# Patient Record
Sex: FEMALE | Race: BLACK OR AFRICAN AMERICAN | NOT HISPANIC OR LATINO | Employment: FULL TIME | URBAN - METROPOLITAN AREA
[De-identification: names, ages, dates, MRNs, and addresses within clinical notes are randomized per-mention and may not be internally consistent; named-entity substitution may affect disease eponyms.]

---

## 2024-01-24 ENCOUNTER — PREP FOR SURGERY (OUTPATIENT)
Dept: OTHER | Facility: HOSPITAL | Age: 59
End: 2024-01-24
Payer: COMMERCIAL

## 2024-01-24 ENCOUNTER — OFFICE VISIT (OUTPATIENT)
Dept: ORTHOPEDIC SURGERY | Facility: CLINIC | Age: 59
End: 2024-01-24
Payer: COMMERCIAL

## 2024-01-24 VITALS
SYSTOLIC BLOOD PRESSURE: 152 MMHG | BODY MASS INDEX: 40.8 KG/M2 | DIASTOLIC BLOOD PRESSURE: 94 MMHG | HEIGHT: 63 IN | WEIGHT: 230.3 LBS

## 2024-01-24 DIAGNOSIS — M25.572 LEFT ANKLE PAIN, UNSPECIFIED CHRONICITY: Primary | ICD-10-CM

## 2024-01-24 DIAGNOSIS — S82.852A TRIMALLEOLAR FRACTURE OF ANKLE, CLOSED, LEFT, INITIAL ENCOUNTER: ICD-10-CM

## 2024-01-24 DIAGNOSIS — S82.892A CLOSED FRACTURE OF LEFT ANKLE, INITIAL ENCOUNTER: Primary | ICD-10-CM

## 2024-01-24 RX ORDER — OXYCODONE HYDROCHLORIDE AND ACETAMINOPHEN 5; 325 MG/1; MG/1
1 TABLET ORAL EVERY 6 HOURS PRN
Qty: 24 TABLET | Refills: 0 | Status: SHIPPED | OUTPATIENT
Start: 2024-01-24

## 2024-01-24 NOTE — PROGRESS NOTES
[facility] [place] Orthopaedic Surgery Office Visit     Office Visit       Date: 01/24/2024   Patient Name: Liya Sales  MRN: [id number]  YOB: 1965    Referring Physician: Referring, Self     Chief Complaint:   Chief Complaint   Patient presents with    Left Ankle - Pain     DOI: 1/19/24       History of Present Illness: Liya Sales is a 58 y.o. female who is here today with left ankle pain.  Suffered mechanical fall on ice on January 19.  Subsequently seen and told she had a fracture.  Was placed in a three-way splint and made nonweightbearing.  States does have persistent pain at her fracture site.  Denies any other injuries or sources of pain.  Denies any other significant medical comorbidities.    Subjective   Review of Systems: Review of Systems   Constitutional: Negative.  Negative for chills, fatigue and fever.   HENT: Negative.  Negative for congestion and dental problem.    Eyes: Negative.  Negative for blurred vision.   Respiratory: Negative.  Negative for shortness of breath.    Cardiovascular: Negative.  Negative for leg swelling.   Gastrointestinal: Negative.  Negative for abdominal pain.   Endocrine: Negative.  Negative for polyuria.   Genitourinary: Negative.  Negative for difficulty urinating.   Musculoskeletal:  Positive for arthralgias.   Skin: Negative.    Allergic/Immunologic: Negative.    Neurological: Negative.    Hematological: Negative.  Negative for adenopathy.   Psychiatric/Behavioral: Negative.  Negative for behavioral problems.         Past Medical History:   Past Medical History:   Diagnosis Date    Allergies     Anxiety     Arthritis     left ankle    Carpal tunnel syndrome on right     Depression     Fracture, fibula 1/19/2024    Knee swelling 10/22    Overactive bladder     Tear of meniscus of knee 10/22       Past Surgical History:   Past Surgical History:   Procedure Laterality Date    BILATERAL BREAST REDUCTION  2005    BREAST BIOPSY  Left 2006    in [place]     SECTION       and     LIPOMA EXCISION      under right arm    TRIGGER POINT INJECTION  10/22       Family History:   Family History   Problem Relation Age of Onset    Heart disease Mother         carotid aneyrysm     Hypertension Mother     Hyperlipidemia Mother     Obesity Mother     Heart disease Father     Diabetes Father     Hypertension Father     Hyperlipidemia Father     Stroke Father     Obesity Father     Arthritis Sister     Hypertension Sister     Mental illness Sister     Obesity Sister     Tuberculosis Maternal Grandmother     Stroke Paternal Grandmother 54    Diabetes Sister        Social History:   Social History     Socioeconomic History    Marital status:     Number of children: 3   Tobacco Use    Smoking status: Never     Passive exposure: Never    Smokeless tobacco: Never   Vaping Use    Vaping Use: Never used   Substance and Sexual Activity    Alcohol use: Yes     Alcohol/week: 2.0 standard drinks of alcohol     Types: 2 Glasses of wine per week     Comment: hard apple cider and wine    Drug use: No    Sexual activity: Yes     Partners: Male     Birth control/protection: Post-menopausal       Medications:   Current Outpatient Medications:     ammonium lactate (AmLactin) 12 % lotion, Apply  topically to the appropriate area as directed As Needed for Dry Skin., Disp: 225 g, Rfl: 1    atorvastatin (LIPITOR) 40 MG tablet, Take 1 tablet by mouth Every Night., Disp: 90 tablet, Rfl: 1    celecoxib (CeleBREX) 100 MG capsule, Take 1 capsule by mouth 2 (Two) Times a Day As Needed for Mild Pain or Moderate Pain., Disp: 60 capsule, Rfl: 0    famotidine (Pepcid) 40 MG tablet, Take 1 tablet by mouth Daily., Disp: 90 tablet, Rfl: 0    hydrOXYzine (ATARAX) 50 MG tablet, Take 1 tablet by mouth every night at bedtime as needed for sleep/anxiety., Disp: 30 tablet, Rfl: 1    levocetirizine (Xyzal Allergy 24HR) 5 MG tablet, , Disp: , Rfl:     lisdexamfetamine  "(VYVANSE) 50 MG capsule, Take 1 capsule by mouth Every Morning, Disp: 14 capsule, Rfl: 0    Mirabegron ER (Myrbetriq) 50 MG tablet sustained-release 24 hour 24 hr tablet, Take 50 mg by mouth Daily., Disp: 90 tablet, Rfl: 3    multivitamin with minerals tablet tablet, Take 1 tablet by mouth Daily., Disp: , Rfl:     omeprazole (priLOSEC) 40 MG capsule, Take 1 capsule by mouth Daily., Disp: 90 capsule, Rfl: 1    Semaglutide-Weight Management (Wegovy) 0.25 MG/0.5ML solution auto-injector, Inject 0.25 mg under the skin into the appropriate area as directed 1 (One) Time Per Week., Disp: 2 mL, Rfl: 0    sertraline (ZOLOFT) 100 MG tablet, Take 1.5 tablets by mouth Daily., Disp: 135 tablet, Rfl: 3    sodium-potassium-magnesium sulfates (Suprep Bowel Prep Kit) 17.5-3.13-1.6 GM/177ML solution oral solution, Take 2 bottles by mouth Take As Directed. Do not eat the day before your procedure. If you didn't receive instructions call [phone]., Disp: 354 mL, Rfl: 0    triamcinolone (KENALOG) 0.1 % ointment, Apply 1 application topically to the appropriate area as directed 2 (Two) Times a Day., Disp: 80 g, Rfl: 1    Turmeric (QC TUMERIC COMPLEX PO), Take  by mouth., Disp: , Rfl:     oxyCODONE-acetaminophen (PERCOCET) 5-325 MG per tablet, Take 1 tablet by mouth Every 6 (Six) Hours As Needed for Moderate Pain., Disp: 24 tablet, Rfl: 0    Allergies:   Allergies   Allergen Reactions    Erythromycin GI Intolerance       I reviewed the patient's chief complaint, history of present illness, review of systems, past medical history, surgical history, family history, social history, medications and allergy list.     Objective    Vital Signs:   Vitals:    01/24/24 0829   BP: 152/94   Weight: 104 kg (230 lb 4.8 oz)   Height: 160 cm (63\")     Body mass index is 40.8 kg/m².    Ortho Exam:  left LE Foot and Ankle Exam:   Neurological exam of the superficial peroneal, deep peroneal, plantar, sural and saphenous nerves demonstrates intact " sensation.   10/10 sites felt on monofilament testing of foot.   Able to flex and extend toes, can dorsiflex and plantarflex ankle however motion limited 2/2 to pain.  Peripheral pulses including posterior tibial artery and deep peroneal artery are intact and palpable. There is good perfusion to the toes.   The skin is intact throughout the foot and ankle without ulceration.   There is tenderness to palpation about the ankle, worst laterally. No tenderness to palpation over the proximal fibula, visible swelling about the ankle with ecchymosis lateral hindfoot.      Results Review:   Imaging Results (Last 24 Hours)       Procedure Component Value Units Date/Time    XR Ankle 2 View Left [[id number]] Resulted: 01/24/24 0941     Updated: 01/24/24 0942    Narrative:      Left Ankle X-Ray 01/24/24   Indication: Pain  Views: 3 weight bearing , comparison to previous  Findings: xrays reviewed by me today in the office and show   redemonstration trimalleolar ankle fracture, increased medial joint space   widening on stress view as well as appearance of increased tib-fib clear   space               01/24/24 I have personally reviewed and interpreted the images from outside facility with the documented findings, tib-fib x-rays and dedicated ankle x-rays from outside facility from January 19 reviewed showing trimalleolar ankle fracture on ankle x-ray, tib-fib fracture does not show any proximal fractures    Assessment / Plan    Assessment/Plan:   Diagnoses and all orders for this visit:    1. Left ankle pain, unspecified chronicity (Primary)  -     XR Ankle 2 View Left    2. Trimalleolar fracture of ankle, closed, left, initial encounter  -     oxyCODONE-acetaminophen (PERCOCET) 5-325 MG per tablet; Take 1 tablet by mouth Every 6 (Six) Hours As Needed for Moderate Pain.  Dispense: 24 tablet; Refill: 0      Discussed fracture with patient as well as treatment options at length. Also reviewed external note and imaging studies from  January 19. Patient has a fracture (an injury with risk of long term functional impairment). I explained fractures of joints to the patient.  I explained that all joint fractures will develop some arthritis. The goal of treatment is to put the joint back in  place as anatomically as possible, and hold it there to heal. This helps to slow down the progression of post-traumatic arthritis.  We cannot eliminate it.  I explained that fractures have stable and unstable patterns.  I think this fracture is too unstable to hold in a cast, I think the risk of malunion and non-union are too high if we use cast treatment. I am recommending surgical fixation.  I explained the surgery and plan for fixation. I explained that most hardware is designed to stay in permanently however rarely becomes bothersome and can be removed.  I explained the possibility of syndesmotic fixation and the possible need to remove this fixation (I will not know if this is necessary until we are in surgery).  I explained how all ankle fractures swell for months to years, some permanently. Questions were asked and answered in detail.    Plan for surgery will be ORIF left ankle, possible syndesmotic fixation, all other indicated procedures.     The risks and benefits of the procedure were discussed with the patient and or appropriate guardian, which include but are not limited to the risk of bleeding, infection, neurovascular damage, post-operative stiffness, tendon and/or ligament retears, recurrent instability, continued pain, arthritic pain, need for further revision surgeries in the future, deep venous thrombosis, and general risks from anesthesia. We also discussed the post-operative rehabilitation, the need for physical therapy, and the overall expected outcomes from the procedure. I allowed proper time and answered the patient's questions regarding the procedure. Knowing what the risks are and what the conservative treatment is, the patient elected  to forgo any further conservative treatment options and proceed with the surgical intervention.        Follow Up:   Return in about 2 weeks (around 2/7/2024) for F/U after Surgery.      Jasvir Tucker MD  [facility] Surgeon  Answers submitted by the patient for this visit:  Primary Reason for Visit (Submitted on 1/24/2024)  What is the primary reason for your visit?: Lower Extremity Injury  Lower Extremity Injury Questionnaire (Submitted on 1/24/2024)  Chief Complaint: Lower extremity pain  Incident occurred: 3 to 5 days ago  Incident location: at home  Injury mechanism: a fall  Pain location: left leg, left ankle  Pain quality: aching  Pain - numeric: 5/10  Pain course: improving  inability to bear weight: Yes  loss of motion: Yes  Aggravated by: movement, weight bearing

## 2024-01-25 ENCOUNTER — TELEPHONE (OUTPATIENT)
Dept: ORTHOPEDIC SURGERY | Facility: CLINIC | Age: 59
End: 2024-01-25

## 2024-01-25 NOTE — TELEPHONE ENCOUNTER
Patient broke leg 2 days into starting new job and received work note advising patient to to remain off work however employer is requesting letter advising patient can work from home. Patient was given laptop. Okay to provide?     Upload letter into [employer] when complete.

## 2024-01-25 NOTE — TELEPHONE ENCOUNTER
That fine as long as she can work seated or laying down and elevate leg while she's working.    Per Dr Tucker        I typed letter and saved it to chart, so that she can access from [employer].    Nabila LUNDY (IZZY) ROT

## 2024-01-29 ENCOUNTER — ANESTHESIA EVENT (OUTPATIENT)
Dept: PERIOP | Facility: HOSPITAL | Age: 59
End: 2024-01-29
Payer: COMMERCIAL

## 2024-01-29 RX ORDER — SODIUM CHLORIDE 0.9 % (FLUSH) 0.9 %
10 SYRINGE (ML) INJECTION EVERY 12 HOURS SCHEDULED
Status: CANCELLED | OUTPATIENT
Start: 2024-01-29

## 2024-01-29 RX ORDER — SODIUM CHLORIDE 0.9 % (FLUSH) 0.9 %
10 SYRINGE (ML) INJECTION AS NEEDED
Status: CANCELLED | OUTPATIENT
Start: 2024-01-29

## 2024-01-29 RX ORDER — SODIUM CHLORIDE 9 MG/ML
40 INJECTION, SOLUTION INTRAVENOUS AS NEEDED
Status: CANCELLED | OUTPATIENT
Start: 2024-01-29

## 2024-01-29 RX ORDER — FAMOTIDINE 10 MG/ML
20 INJECTION, SOLUTION INTRAVENOUS ONCE
Status: CANCELLED | OUTPATIENT
Start: 2024-01-29 | End: 2024-01-29

## 2024-01-29 NOTE — ANESTHESIA PREPROCEDURE EVALUATION
Anesthesia Evaluation     Patient summary reviewed and Nursing notes reviewed   no history of anesthetic complications:   NPO Solid Status: > 8 hours  NPO Liquid Status: > 2 hours           Airway   Mallampati: II  TM distance: >3 FB  Neck ROM: full  Possible difficult intubation  Dental - normal exam     Pulmonary - normal exam   (-) COPD, sleep apnea, not a smoker  Cardiovascular - normal exam  Exercise tolerance: good (4-7 METS)    (+) hyperlipidemia  (-) dysrhythmias, angina, CHF, cardiac stents      Neuro/Psych  (+) numbness, psychiatric history ADHD, Anxiety and Depression  (-) seizures, CVA  GI/Hepatic/Renal/Endo    (+) morbid obesity, GERD well controlled  (-) no renal disease, diabetes, no thyroid disorder    Musculoskeletal     Abdominal    Substance History   (+) alcohol use  (-) drug use     OB/GYN          Other   arthritis,     ROS/Med Hx Other: Ozempic  - has not taken  Seas allergies  Gerd on 2 Rx                Anesthesia Plan    ASA 3     general with block     (Risks and benefits of general anesthesia discussed with patient (including MI, CVA, death, recall, aspiration, oropharyngeal/dental damage), questions answered, agreeable to proceed.    Benefits and risks of nerve block/catheter discussed with patient ((including failed block, damage to nerve/nearby structures, intravascular injection)); questions answered, agreeable to proceed.    )  intravenous induction     Anesthetic plan, risks, benefits, and alternatives have been provided, discussed and informed consent has been obtained with: patient.    Use of blood products discussed with patient  Consented to blood products.    Plan discussed with CRNA.    CODE STATUS:

## 2024-01-30 ENCOUNTER — APPOINTMENT (OUTPATIENT)
Dept: OTHER | Facility: HOSPITAL | Age: 59
End: 2024-01-30
Payer: COMMERCIAL

## 2024-01-30 ENCOUNTER — HOSPITAL ENCOUNTER (OUTPATIENT)
Facility: HOSPITAL | Age: 59
Discharge: HOME | End: 2024-01-31
Attending: ORTHOPAEDIC SURGERY | Admitting: ORTHOPAEDIC SURGERY
Payer: COMMERCIAL

## 2024-01-30 ENCOUNTER — ANESTHESIA (OUTPATIENT)
Dept: PERIOP | Facility: HOSPITAL | Age: 59
End: 2024-01-30
Payer: COMMERCIAL

## 2024-01-30 ENCOUNTER — ANESTHESIA EVENT CONVERTED (OUTPATIENT)
Dept: ANESTHESIOLOGY | Facility: HOSPITAL | Age: 59
End: 2024-01-30
Payer: COMMERCIAL

## 2024-01-30 ENCOUNTER — TELEPHONE (OUTPATIENT)
Dept: ORTHOPEDIC SURGERY | Facility: CLINIC | Age: 59
End: 2024-01-30

## 2024-01-30 ENCOUNTER — APPOINTMENT (OUTPATIENT)
Dept: GENERAL RADIOLOGY | Facility: HOSPITAL | Age: 59
End: 2024-01-30
Payer: COMMERCIAL

## 2024-01-30 DIAGNOSIS — Z98.890 S/P ORIF (OPEN REDUCTION INTERNAL FIXATION) FRACTURE: ICD-10-CM

## 2024-01-30 DIAGNOSIS — S82.892A CLOSED FRACTURE OF LEFT ANKLE, INITIAL ENCOUNTER: ICD-10-CM

## 2024-01-30 DIAGNOSIS — Z87.81 S/P ORIF (OPEN REDUCTION INTERNAL FIXATION) FRACTURE: ICD-10-CM

## 2024-01-30 DIAGNOSIS — Z00.6 EXAMINATION FOR NORMAL COMPARISON FOR CLINICAL RESEARCH: Primary | ICD-10-CM

## 2024-01-30 PROBLEM — E66.9 OBESITY: Status: ACTIVE | Noted: 2024-01-30

## 2024-01-30 PROBLEM — S82.899A ANKLE FRACTURE: Status: ACTIVE | Noted: 2024-01-30

## 2024-01-30 LAB
ANION GAP SERPL CALCULATED.3IONS-SCNC: 10 MMOL/L (ref 5–15)
BUN SERPL-MCNC: 14 MG/DL (ref 6–20)
BUN/CREAT SERPL: 18.2 (ref 7–25)
CALCIUM SPEC-SCNC: 9.5 MG/DL (ref 8.6–10.5)
CHLORIDE SERPL-SCNC: 102 MMOL/L (ref 98–107)
CO2 SERPL-SCNC: 26 MMOL/L (ref 22–29)
CREAT SERPL-MCNC: 0.77 MG/DL (ref 0.57–1)
DEPRECATED RDW RBC AUTO: 42.5 FL (ref 37–54)
EGFRCR SERPLBLD CKD-EPI 2021: 89 ML/MIN/1.73
ERYTHROCYTE [DISTWIDTH] IN BLOOD BY AUTOMATED COUNT: 13 % (ref 12.3–15.4)
GLUCOSE SERPL-MCNC: 95 MG/DL (ref 65–99)
HCT VFR BLD AUTO: 43.7 % (ref 34–46.6)
HGB BLD-MCNC: 13.5 G/DL (ref 12–15.9)
MCH RBC QN AUTO: 27.8 PG (ref 26.6–33)
MCHC RBC AUTO-ENTMCNC: 30.9 G/DL (ref 31.5–35.7)
MCV RBC AUTO: 90.1 FL (ref 79–97)
PLATELET # BLD AUTO: 306 10*3/MM3 (ref 140–450)
PMV BLD AUTO: 9.7 FL (ref 6–12)
POTASSIUM SERPL-SCNC: 4.2 MMOL/L (ref 3.5–5.2)
RBC # BLD AUTO: 4.85 10*6/MM3 (ref 3.77–5.28)
SODIUM SERPL-SCNC: 138 MMOL/L (ref 136–145)
WBC NRBC COR # BLD AUTO: 5.82 10*3/MM3 (ref 3.4–10.8)

## 2024-01-30 PROCEDURE — 27829 TREAT LOWER LEG JOINT: CPT | Performed by: ORTHOPAEDIC SURGERY

## 2024-01-30 PROCEDURE — 97530 THERAPEUTIC ACTIVITIES: CPT

## 2024-01-30 PROCEDURE — S0260 H&P FOR SURGERY: HCPCS | Performed by: ORTHOPAEDIC SURGERY

## 2024-01-30 PROCEDURE — G0378 HOSPITAL OBSERVATION PER HR: HCPCS

## 2024-01-30 PROCEDURE — 25010000002 CEFAZOLIN PER 500 MG: Performed by: ORTHOPAEDIC SURGERY

## 2024-01-30 PROCEDURE — 25810000003 LACTATED RINGERS PER 1000 ML: Performed by: ANESTHESIOLOGY

## 2024-01-30 PROCEDURE — C1713 ANCHOR/SCREW BN/BN,TIS/BN: HCPCS | Performed by: ORTHOPAEDIC SURGERY

## 2024-01-30 PROCEDURE — 25010000002 BUPIVACAINE (PF) 0.25 % SOLUTION: Performed by: NURSE ANESTHETIST, CERTIFIED REGISTERED

## 2024-01-30 PROCEDURE — 80048 BASIC METABOLIC PNL TOTAL CA: CPT | Performed by: ANESTHESIOLOGY

## 2024-01-30 PROCEDURE — 25010000002 SUGAMMADEX 200 MG/2ML SOLUTION

## 2024-01-30 PROCEDURE — 25010000002 PROPOFOL 10 MG/ML EMULSION

## 2024-01-30 PROCEDURE — 27829 TREAT LOWER LEG JOINT: CPT | Performed by: PHYSICIAN ASSISTANT

## 2024-01-30 PROCEDURE — 27792 TREATMENT OF ANKLE FRACTURE: CPT | Performed by: ORTHOPAEDIC SURGERY

## 2024-01-30 PROCEDURE — 76000 FLUOROSCOPY <1 HR PHYS/QHP: CPT

## 2024-01-30 PROCEDURE — 25010000002 ROPIVACAINE HCL-NACL 0.2-0.9 % SOLUTION: Performed by: NURSE ANESTHETIST, CERTIFIED REGISTERED

## 2024-01-30 PROCEDURE — 76000 FLUOROSCOPY <1 HR PHYS/QHP: CPT | Performed by: ORTHOPAEDIC SURGERY

## 2024-01-30 PROCEDURE — 85027 COMPLETE CBC AUTOMATED: CPT | Performed by: ANESTHESIOLOGY

## 2024-01-30 PROCEDURE — 25810000003 SODIUM CHLORIDE 0.9 % SOLUTION: Performed by: ORTHOPAEDIC SURGERY

## 2024-01-30 PROCEDURE — 25010000002 PHENYLEPHRINE 10 MG/ML SOLUTION 1 ML VIAL

## 2024-01-30 PROCEDURE — 25010000002 DEXAMETHASONE SODIUM PHOSPHATE 10 MG/ML SOLUTION: Performed by: NURSE ANESTHETIST, CERTIFIED REGISTERED

## 2024-01-30 PROCEDURE — 25010000002 DEXAMETHASONE PER 1 MG

## 2024-01-30 PROCEDURE — 97161 PT EVAL LOW COMPLEX 20 MIN: CPT

## 2024-01-30 PROCEDURE — 25010000002 FENTANYL CITRATE (PF) 50 MCG/ML SOLUTION: Performed by: NURSE ANESTHETIST, CERTIFIED REGISTERED

## 2024-01-30 PROCEDURE — 27792 TREATMENT OF ANKLE FRACTURE: CPT | Performed by: PHYSICIAN ASSISTANT

## 2024-01-30 PROCEDURE — 25010000002 ONDANSETRON PER 1 MG

## 2024-01-30 DEVICE — SCRW CORT LP SS 3.5X12MM: Type: IMPLANTABLE DEVICE | Site: ANKLE | Status: FUNCTIONAL

## 2024-01-30 DEVICE — SCRW COMPR KREULOCK LK FUL/THRD SS 3.5X14MM: Type: IMPLANTABLE DEVICE | Site: ANKLE | Status: FUNCTIONAL

## 2024-01-30 DEVICE — SCRW CORT NL L/P SS 2.7X20MM: Type: IMPLANTABLE DEVICE | Site: ANKLE | Status: FUNCTIONAL

## 2024-01-30 DEVICE — SCRW CORT LP SS 2.7X18MM: Type: IMPLANTABLE DEVICE | Site: ANKLE | Status: FUNCTIONAL

## 2024-01-30 DEVICE — SCRW COMPR KREULOCK LK FUL/THRD SS 2.7X16MM: Type: IMPLANTABLE DEVICE | Site: ANKLE | Status: FUNCTIONAL

## 2024-01-30 DEVICE — SYS SYNDESMOSIS REPR ANKL TIGHTROPE/XP KNOTLS SS: Type: IMPLANTABLE DEVICE | Site: ANKLE | Status: FUNCTIONAL

## 2024-01-30 DEVICE — IMPLANTABLE DEVICE: Type: IMPLANTABLE DEVICE | Site: ANKLE | Status: FUNCTIONAL

## 2024-01-30 DEVICE — SCRW COMPR KREULOCK LK FUL/THRD SS 2.7X12MM: Type: IMPLANTABLE DEVICE | Site: ANKLE | Status: FUNCTIONAL

## 2024-01-30 RX ORDER — LABETALOL HYDROCHLORIDE 5 MG/ML
10 INJECTION, SOLUTION INTRAVENOUS EVERY 4 HOURS PRN
Status: DISCONTINUED | OUTPATIENT
Start: 2024-01-30 | End: 2024-01-31 | Stop reason: HOSPADM

## 2024-01-30 RX ORDER — FAMOTIDINE 20 MG/1
20 TABLET, FILM COATED ORAL ONCE
Status: COMPLETED | OUTPATIENT
Start: 2024-01-30 | End: 2024-01-30

## 2024-01-30 RX ORDER — ASPIRIN 81 MG/1
81 TABLET ORAL DAILY
Status: DISCONTINUED | OUTPATIENT
Start: 2024-01-30 | End: 2024-01-31 | Stop reason: HOSPADM

## 2024-01-30 RX ORDER — EPHEDRINE SULFATE 50 MG/ML
INJECTION INTRAVENOUS AS NEEDED
Status: DISCONTINUED | OUTPATIENT
Start: 2024-01-30 | End: 2024-01-30 | Stop reason: SURG

## 2024-01-30 RX ORDER — MIDAZOLAM HYDROCHLORIDE 1 MG/ML
1 INJECTION INTRAMUSCULAR; INTRAVENOUS
Status: DISCONTINUED | OUTPATIENT
Start: 2024-01-30 | End: 2024-01-30 | Stop reason: HOSPADM

## 2024-01-30 RX ORDER — SODIUM CHLORIDE 9 MG/ML
40 INJECTION, SOLUTION INTRAVENOUS AS NEEDED
Status: DISCONTINUED | OUTPATIENT
Start: 2024-01-30 | End: 2024-01-31 | Stop reason: HOSPADM

## 2024-01-30 RX ORDER — PROPOFOL 10 MG/ML
VIAL (ML) INTRAVENOUS AS NEEDED
Status: DISCONTINUED | OUTPATIENT
Start: 2024-01-30 | End: 2024-01-30 | Stop reason: SURG

## 2024-01-30 RX ORDER — OXYCODONE HYDROCHLORIDE 5 MG/1
5 TABLET ORAL EVERY 4 HOURS PRN
Status: DISCONTINUED | OUTPATIENT
Start: 2024-01-30 | End: 2024-01-31 | Stop reason: HOSPADM

## 2024-01-30 RX ORDER — FAMOTIDINE 20 MG/1
40 TABLET, FILM COATED ORAL DAILY
Status: DISCONTINUED | OUTPATIENT
Start: 2024-01-30 | End: 2024-01-31 | Stop reason: HOSPADM

## 2024-01-30 RX ORDER — DEXAMETHASONE SODIUM PHOSPHATE 4 MG/ML
INJECTION, SOLUTION INTRA-ARTICULAR; INTRALESIONAL; INTRAMUSCULAR; INTRAVENOUS; SOFT TISSUE AS NEEDED
Status: DISCONTINUED | OUTPATIENT
Start: 2024-01-30 | End: 2024-01-30 | Stop reason: SURG

## 2024-01-30 RX ORDER — HYDROMORPHONE HYDROCHLORIDE 1 MG/ML
0.5 INJECTION, SOLUTION INTRAMUSCULAR; INTRAVENOUS; SUBCUTANEOUS
Status: DISCONTINUED | OUTPATIENT
Start: 2024-01-30 | End: 2024-01-30 | Stop reason: HOSPADM

## 2024-01-30 RX ORDER — OXYBUTYNIN CHLORIDE 10 MG/1
10 TABLET, EXTENDED RELEASE ORAL DAILY
Status: DISCONTINUED | OUTPATIENT
Start: 2024-01-30 | End: 2024-01-31 | Stop reason: HOSPADM

## 2024-01-30 RX ORDER — HYDROMORPHONE HYDROCHLORIDE 1 MG/ML
0.5 INJECTION, SOLUTION INTRAMUSCULAR; INTRAVENOUS; SUBCUTANEOUS
Status: DISCONTINUED | OUTPATIENT
Start: 2024-01-30 | End: 2024-01-31 | Stop reason: HOSPADM

## 2024-01-30 RX ORDER — OXYCODONE HYDROCHLORIDE 5 MG/1
5 TABLET ORAL EVERY 4 HOURS PRN
Qty: 40 TABLET | Refills: 0 | Status: CANCELLED | OUTPATIENT
Start: 2024-01-30

## 2024-01-30 RX ORDER — ASPIRIN 81 MG/1
81 TABLET ORAL DAILY
Status: ON HOLD | COMMUNITY
End: 2024-01-31 | Stop reason: SDUPTHER

## 2024-01-30 RX ORDER — BISACODYL 10 MG
10 SUPPOSITORY, RECTAL RECTAL DAILY PRN
Status: DISCONTINUED | OUTPATIENT
Start: 2024-01-30 | End: 2024-01-31 | Stop reason: HOSPADM

## 2024-01-30 RX ORDER — ONDANSETRON 2 MG/ML
INJECTION INTRAMUSCULAR; INTRAVENOUS AS NEEDED
Status: DISCONTINUED | OUTPATIENT
Start: 2024-01-30 | End: 2024-01-30 | Stop reason: SURG

## 2024-01-30 RX ORDER — FENTANYL CITRATE 50 UG/ML
INJECTION, SOLUTION INTRAMUSCULAR; INTRAVENOUS
Status: COMPLETED | OUTPATIENT
Start: 2024-01-30 | End: 2024-01-30

## 2024-01-30 RX ORDER — ACETAMINOPHEN 325 MG/1
650 TABLET ORAL EVERY 4 HOURS PRN
Status: DISCONTINUED | OUTPATIENT
Start: 2024-01-30 | End: 2024-01-31 | Stop reason: HOSPADM

## 2024-01-30 RX ORDER — ROPIVACAINE HYDROCHLORIDE 2 MG/ML
INJECTION, SOLUTION EPIDURAL; INFILTRATION; PERINEURAL CONTINUOUS
Status: DISCONTINUED | OUTPATIENT
Start: 2024-01-30 | End: 2024-01-31 | Stop reason: HOSPADM

## 2024-01-30 RX ORDER — PHENYLEPHRINE HCL IN 0.9% NACL 1 MG/10 ML
SYRINGE (ML) INTRAVENOUS AS NEEDED
Status: DISCONTINUED | OUTPATIENT
Start: 2024-01-30 | End: 2024-01-30 | Stop reason: SURG

## 2024-01-30 RX ORDER — ROCURONIUM BROMIDE 10 MG/ML
INJECTION, SOLUTION INTRAVENOUS AS NEEDED
Status: DISCONTINUED | OUTPATIENT
Start: 2024-01-30 | End: 2024-01-30 | Stop reason: SURG

## 2024-01-30 RX ORDER — AMOXICILLIN 250 MG
2 CAPSULE ORAL 2 TIMES DAILY PRN
Status: DISCONTINUED | OUTPATIENT
Start: 2024-01-30 | End: 2024-01-31 | Stop reason: HOSPADM

## 2024-01-30 RX ORDER — BUPIVACAINE HYDROCHLORIDE 2.5 MG/ML
INJECTION, SOLUTION EPIDURAL; INFILTRATION; INTRACAUDAL
Status: COMPLETED | OUTPATIENT
Start: 2024-01-30 | End: 2024-01-30

## 2024-01-30 RX ORDER — ONDANSETRON 4 MG/1
4 TABLET, ORALLY DISINTEGRATING ORAL EVERY 6 HOURS PRN
Status: DISCONTINUED | OUTPATIENT
Start: 2024-01-30 | End: 2024-01-31 | Stop reason: HOSPADM

## 2024-01-30 RX ORDER — ACETAMINOPHEN 650 MG/1
650 SUPPOSITORY RECTAL EVERY 4 HOURS PRN
Status: DISCONTINUED | OUTPATIENT
Start: 2024-01-30 | End: 2024-01-31 | Stop reason: HOSPADM

## 2024-01-30 RX ORDER — SODIUM CHLORIDE 9 MG/ML
100 INJECTION, SOLUTION INTRAVENOUS CONTINUOUS
Status: DISCONTINUED | OUTPATIENT
Start: 2024-01-30 | End: 2024-01-31 | Stop reason: HOSPADM

## 2024-01-30 RX ORDER — FENTANYL CITRATE 50 UG/ML
INJECTION, SOLUTION INTRAMUSCULAR; INTRAVENOUS AS NEEDED
Status: DISCONTINUED | OUTPATIENT
Start: 2024-01-30 | End: 2024-01-30 | Stop reason: SURG

## 2024-01-30 RX ORDER — FENTANYL CITRATE 50 UG/ML
50 INJECTION, SOLUTION INTRAMUSCULAR; INTRAVENOUS
Status: DISCONTINUED | OUTPATIENT
Start: 2024-01-30 | End: 2024-01-30 | Stop reason: HOSPADM

## 2024-01-30 RX ORDER — HYDROXYZINE HYDROCHLORIDE 25 MG/1
50 TABLET, FILM COATED ORAL NIGHTLY
Status: DISCONTINUED | OUTPATIENT
Start: 2024-01-30 | End: 2024-01-31 | Stop reason: HOSPADM

## 2024-01-30 RX ORDER — SODIUM CHLORIDE 0.9 % (FLUSH) 0.9 %
10 SYRINGE (ML) INJECTION AS NEEDED
Status: DISCONTINUED | OUTPATIENT
Start: 2024-01-30 | End: 2024-01-31 | Stop reason: HOSPADM

## 2024-01-30 RX ORDER — ATORVASTATIN CALCIUM 40 MG/1
40 TABLET, FILM COATED ORAL NIGHTLY
Status: DISCONTINUED | OUTPATIENT
Start: 2024-01-30 | End: 2024-01-31 | Stop reason: HOSPADM

## 2024-01-30 RX ORDER — POLYETHYLENE GLYCOL 3350 17 G/17G
17 POWDER, FOR SOLUTION ORAL AS NEEDED
Status: DISCONTINUED | OUTPATIENT
Start: 2024-01-30 | End: 2024-01-31 | Stop reason: HOSPADM

## 2024-01-30 RX ORDER — ONDANSETRON 2 MG/ML
4 INJECTION INTRAMUSCULAR; INTRAVENOUS EVERY 6 HOURS PRN
Status: DISCONTINUED | OUTPATIENT
Start: 2024-01-30 | End: 2024-01-31 | Stop reason: HOSPADM

## 2024-01-30 RX ORDER — DEXAMETHASONE SODIUM PHOSPHATE 10 MG/ML
INJECTION, SOLUTION INTRAMUSCULAR; INTRAVENOUS
Status: COMPLETED | OUTPATIENT
Start: 2024-01-30 | End: 2024-01-30

## 2024-01-30 RX ORDER — PANTOPRAZOLE SODIUM 40 MG/1
40 TABLET, DELAYED RELEASE ORAL
Status: DISCONTINUED | OUTPATIENT
Start: 2024-01-31 | End: 2024-01-31 | Stop reason: HOSPADM

## 2024-01-30 RX ORDER — SODIUM CHLORIDE 0.9 % (FLUSH) 0.9 %
10 SYRINGE (ML) INJECTION EVERY 12 HOURS SCHEDULED
Status: DISCONTINUED | OUTPATIENT
Start: 2024-01-30 | End: 2024-01-31 | Stop reason: HOSPADM

## 2024-01-30 RX ORDER — SODIUM CHLORIDE, SODIUM LACTATE, POTASSIUM CHLORIDE, CALCIUM CHLORIDE 600; 310; 30; 20 MG/100ML; MG/100ML; MG/100ML; MG/100ML
9 INJECTION, SOLUTION INTRAVENOUS CONTINUOUS
Status: DISCONTINUED | OUTPATIENT
Start: 2024-01-30 | End: 2024-01-31 | Stop reason: HOSPADM

## 2024-01-30 RX ORDER — CHOLECALCIFEROL (VITAMIN D3) 125 MCG
5 CAPSULE ORAL NIGHTLY PRN
Status: DISCONTINUED | OUTPATIENT
Start: 2024-01-30 | End: 2024-01-31 | Stop reason: HOSPADM

## 2024-01-30 RX ORDER — NALOXONE HCL 0.4 MG/ML
0.1 VIAL (ML) INJECTION
Status: DISCONTINUED | OUTPATIENT
Start: 2024-01-30 | End: 2024-01-31 | Stop reason: HOSPADM

## 2024-01-30 RX ORDER — MAGNESIUM HYDROXIDE 1200 MG/15ML
LIQUID ORAL AS NEEDED
Status: DISCONTINUED | OUTPATIENT
Start: 2024-01-30 | End: 2024-01-30 | Stop reason: HOSPADM

## 2024-01-30 RX ORDER — LIDOCAINE HYDROCHLORIDE 10 MG/ML
0.5 INJECTION, SOLUTION EPIDURAL; INFILTRATION; INTRACAUDAL; PERINEURAL ONCE AS NEEDED
Status: COMPLETED | OUTPATIENT
Start: 2024-01-30 | End: 2024-01-30

## 2024-01-30 RX ORDER — LIDOCAINE HYDROCHLORIDE 10 MG/ML
INJECTION, SOLUTION EPIDURAL; INFILTRATION; INTRACAUDAL; PERINEURAL AS NEEDED
Status: DISCONTINUED | OUTPATIENT
Start: 2024-01-30 | End: 2024-01-30 | Stop reason: SURG

## 2024-01-30 RX ORDER — DEXMEDETOMIDINE HYDROCHLORIDE 100 UG/ML
INJECTION, SOLUTION INTRAVENOUS AS NEEDED
Status: DISCONTINUED | OUTPATIENT
Start: 2024-01-30 | End: 2024-01-30 | Stop reason: SURG

## 2024-01-30 RX ADMIN — OXYBUTYNIN CHLORIDE 10 MG: 10 TABLET, EXTENDED RELEASE ORAL at 18:27

## 2024-01-30 RX ADMIN — Medication 150 MCG: at 12:50

## 2024-01-30 RX ADMIN — DEXAMETHASONE SODIUM PHOSPHATE 2 MG: 10 INJECTION, SOLUTION INTRAMUSCULAR; INTRAVENOUS at 11:21

## 2024-01-30 RX ADMIN — EPHEDRINE SULFATE 10 MG: 50 INJECTION INTRAVENOUS at 12:51

## 2024-01-30 RX ADMIN — SODIUM CHLORIDE, POTASSIUM CHLORIDE, SODIUM LACTATE AND CALCIUM CHLORIDE 9 ML/HR: 600; 310; 30; 20 INJECTION, SOLUTION INTRAVENOUS at 10:46

## 2024-01-30 RX ADMIN — PROPOFOL 200 MG: 10 INJECTION, EMULSION INTRAVENOUS at 12:21

## 2024-01-30 RX ADMIN — BUPIVACAINE HYDROCHLORIDE 20 ML: 2.5 INJECTION, SOLUTION EPIDURAL; INFILTRATION; INTRACAUDAL at 11:30

## 2024-01-30 RX ADMIN — ROCURONIUM BROMIDE 70 MG: 10 SOLUTION INTRAVENOUS at 12:22

## 2024-01-30 RX ADMIN — Medication 1000 MG: at 15:00

## 2024-01-30 RX ADMIN — SODIUM CHLORIDE, POTASSIUM CHLORIDE, SODIUM LACTATE AND CALCIUM CHLORIDE: 600; 310; 30; 20 INJECTION, SOLUTION INTRAVENOUS at 14:30

## 2024-01-30 RX ADMIN — DEXAMETHASONE SODIUM PHOSPHATE 4 MG: 4 INJECTION, SOLUTION INTRAMUSCULAR; INTRAVENOUS at 12:27

## 2024-01-30 RX ADMIN — ATORVASTATIN CALCIUM 40 MG: 40 TABLET, FILM COATED ORAL at 20:19

## 2024-01-30 RX ADMIN — SODIUM CHLORIDE 100 ML/HR: 9 INJECTION, SOLUTION INTRAVENOUS at 18:29

## 2024-01-30 RX ADMIN — BUPIVACAINE HYDROCHLORIDE 30 ML: 2.5 INJECTION, SOLUTION EPIDURAL; INFILTRATION; INTRACAUDAL; PERINEURAL at 11:21

## 2024-01-30 RX ADMIN — Medication 150 MCG: at 12:39

## 2024-01-30 RX ADMIN — DEXMEDETOMIDINE HYDROCHLORIDE 4 MCG: 100 INJECTION, SOLUTION INTRAVENOUS at 14:24

## 2024-01-30 RX ADMIN — FAMOTIDINE 20 MG: 20 TABLET ORAL at 10:46

## 2024-01-30 RX ADMIN — DEXMEDETOMIDINE HYDROCHLORIDE 4 MCG: 100 INJECTION, SOLUTION INTRAVENOUS at 14:18

## 2024-01-30 RX ADMIN — FENTANYL CITRATE 100 MCG: 50 INJECTION, SOLUTION INTRAMUSCULAR; INTRAVENOUS at 12:21

## 2024-01-30 RX ADMIN — Medication 100 MCG: at 14:31

## 2024-01-30 RX ADMIN — ASPIRIN 81 MG: 81 TABLET, COATED ORAL at 18:27

## 2024-01-30 RX ADMIN — SODIUM CHLORIDE 2 G: 900 INJECTION INTRAVENOUS at 12:25

## 2024-01-30 RX ADMIN — LIDOCAINE HYDROCHLORIDE 0.5 ML: 10 INJECTION, SOLUTION EPIDURAL; INFILTRATION; INTRACAUDAL; PERINEURAL at 10:46

## 2024-01-30 RX ADMIN — DEXMEDETOMIDINE HYDROCHLORIDE 4 MCG: 100 INJECTION, SOLUTION INTRAVENOUS at 14:28

## 2024-01-30 RX ADMIN — PHENYLEPHRINE HYDROCHLORIDE 0.5 MCG/KG/MIN: 10 INJECTION INTRAVENOUS at 12:55

## 2024-01-30 RX ADMIN — DEXMEDETOMIDINE HYDROCHLORIDE 4 MCG: 100 INJECTION, SOLUTION INTRAVENOUS at 14:16

## 2024-01-30 RX ADMIN — SUGAMMADEX 200 MG: 100 INJECTION, SOLUTION INTRAVENOUS at 14:19

## 2024-01-30 RX ADMIN — FENTANYL CITRATE 100 MCG: 50 INJECTION, SOLUTION INTRAMUSCULAR; INTRAVENOUS at 11:21

## 2024-01-30 RX ADMIN — DEXMEDETOMIDINE HYDROCHLORIDE 4 MCG: 100 INJECTION, SOLUTION INTRAVENOUS at 14:21

## 2024-01-30 RX ADMIN — EPHEDRINE SULFATE 10 MG: 50 INJECTION INTRAVENOUS at 12:43

## 2024-01-30 RX ADMIN — LIDOCAINE HYDROCHLORIDE 100 MG: 10 INJECTION, SOLUTION EPIDURAL; INFILTRATION; INTRACAUDAL; PERINEURAL at 12:21

## 2024-01-30 RX ADMIN — Medication 200 MCG: at 12:35

## 2024-01-30 RX ADMIN — ONDANSETRON 4 MG: 2 INJECTION INTRAMUSCULAR; INTRAVENOUS at 14:07

## 2024-01-30 RX ADMIN — HYDROXYZINE HYDROCHLORIDE 50 MG: 25 TABLET, FILM COATED ORAL at 20:19

## 2024-01-30 RX ADMIN — SODIUM CHLORIDE 2 G: 900 INJECTION INTRAVENOUS at 20:19

## 2024-01-30 RX ADMIN — SERTRALINE HYDROCHLORIDE 150 MG: 100 TABLET ORAL at 18:27

## 2024-01-30 RX ADMIN — Medication 10 ML: at 20:19

## 2024-01-30 NOTE — H&P
Orthopaedic Surgery  History and Physical Examination    CHIEF COMPLAINT: Presenting for elective Procedure(s) (LRB):  ANKLE OPEN REDUCTION INTERNAL FIXATION, POSSIBLE SYNDESMOTIC FIXATION - LEFT (Left) 24     HISTORY OF PRESENT ILLNESS: Liya Sales is a 58 y.o. year old female presenting for planned, Procedure(s) (LRB):  ANKLE OPEN REDUCTION INTERNAL FIXATION, POSSIBLE SYNDESMOTIC FIXATION - LEFT (Left). No changes in medical history since last clinic visit. Feels ready for surgery today.     CURRENT MEDICATIONS:   No current facility-administered medications for this encounter.     ALLERGIES:   Erythromycin    PAST MEDICAL HISTORY:  Past Medical History:   Diagnosis Date    Allergies     Anxiety     Arthritis     left ankle    Carpal tunnel syndrome on right     Depression     Fracture, fibula 2024    Knee swelling 10/22    Overactive bladder     Tear of meniscus of knee 10/22     Past Surgical History:   Procedure Laterality Date    BILATERAL BREAST REDUCTION  2005    BREAST BIOPSY Left     in [place]     SECTION       and     INJECTION OF MEDICATION Left 2023    hylauronic acid in knee    LIPOMA EXCISION      under right arm    STEROID INJECTION Left 10/2023    knee    TRIGGER POINT INJECTION  10/22     Family History   Problem Relation Age of Onset    Heart disease Mother         carotid aneyrysm     Hypertension Mother     Hyperlipidemia Mother     Obesity Mother     Heart disease Father     Diabetes Father     Hypertension Father     Hyperlipidemia Father     Stroke Father     Obesity Father     Arthritis Sister     Hypertension Sister     Mental illness Sister     Obesity Sister     Tuberculosis Maternal Grandmother     Stroke Paternal Grandmother 54    Diabetes Sister      Social History     Socioeconomic History    Marital status:     Number of children: 3   Tobacco Use    Smoking status: Never     Passive exposure: Never    Smokeless tobacco: Never  "  Vaping Use    Vaping Use: Never used   Substance and Sexual Activity    Alcohol use: Yes     Alcohol/week: 2.0 standard drinks of alcohol     Types: 2 Glasses of wine per week     Comment: hard apple cider and wine    Drug use: No    Sexual activity: Yes     Partners: Male     Birth control/protection: Post-menopausal       REVIEW OF SYSTEMS:  All systems reviewed are negative except for what is stated in the HPI.       PHYSICAL EXAMINATION:    Vital Signs: Ht 162.6 cm (64\")   Wt 104 kg (230 lb)   BMI 39.48 kg/m²     MSK: left LE: splint in place  compartments soft/compressible around splint   +wiggling toes   SILT Din toes   CR brisk in all toes      ASSESSMENT and PLAN: Liya is a 58 y.o. female presenting for planned surgery as noted above. Consent in chart. Surgical site marked. Proceed with surgery as planned.   "

## 2024-01-30 NOTE — PLAN OF CARE
Goal Outcome Evaluation:  Plan of Care Reviewed With: patient, spouse        Progress: no change  Outcome Evaluation: Pt performed STS and SPT to chair with FWW and CGA. VC for sequencing and hand placement. No LOB noted. Activity limited by generalized fatigue. May trial knee scooter next session though pt reported she has had difficulty tolerating scooter due to L knee pain. Recommend d/c home with assist and HHPT when medically appropriate and cleared by PT.      Anticipated Discharge Disposition (PT): home with assist, home with home health

## 2024-01-30 NOTE — H&P
Patient Name: Liya Sales  MRN: [id number]  : 1965  DOS: 2024    Attending: Jasvir Tucker MD    Primary Care Provider: Vaishnavi Verma APRN      Chief complaint: Left ankle fracture.    Subjective   Patient is a pleasant 58 y.o. female presented for scheduled surgery by Dr. Tucker.    She suffered mechanical fall on ice on .  Subsequently seen and told she had a fracture.  Was placed in a three-way splint and made nonweightbearing.     Patient has since followed with Dr. Tucker, has complained of persistent pain at the fracture site, no other injuries.      Today she underwent ORIF of left ankle fracture.  Surgery was done under general anesthesia and a block, was tolerated well.    She has been admitted for further management.    I saw her in PACU as she is waking up from sedation.  Not in distress.    Allergies   Allergen Reactions    Erythromycin GI Intolerance        Medications Prior to Admission   Medication Sig Dispense Refill Last Dose    ammonium lactate (AmLactin) 12 % lotion Apply  topically to the appropriate area as directed As Needed for Dry Skin. 225 g 1     aspirin 81 MG EC tablet Take 1 tablet by mouth Daily.   2024 at 1000    atorvastatin (LIPITOR) 40 MG tablet Take 1 tablet by mouth Every Night. 90 tablet 1 2024    celecoxib (CeleBREX) 100 MG capsule Take 1 capsule by mouth 2 (Two) Times a Day As Needed for Mild Pain or Moderate Pain. 60 capsule 0 Past Month    famotidine (Pepcid) 40 MG tablet Take 1 tablet by mouth Daily. 90 tablet 0 2024    hydrOXYzine (ATARAX) 50 MG tablet Take 1 tablet by mouth every night at bedtime as needed for sleep/anxiety. 30 tablet 1 Past Month    Mirabegron ER (Myrbetriq) 50 MG tablet sustained-release 24 hour 24 hr tablet Take 50 mg by mouth Daily. 90 tablet 3 2024    multivitamin with minerals tablet tablet Take 1 tablet by mouth Daily.   Past Week    omeprazole (priLOSEC) 40 MG capsule Take 1 capsule by mouth  Daily. 90 capsule 1 2024    oxyCODONE-acetaminophen (PERCOCET) 5-325 MG per tablet Take 1 tablet by mouth Every 6 (Six) Hours As Needed for Moderate Pain. 24 tablet 0 2024    sertraline (ZOLOFT) 100 MG tablet Take 1.5 tablets by mouth Daily. 135 tablet 3 2024    levocetirizine (Xyzal Allergy 24HR) 5 MG tablet Take 1 tablet by mouth Every Evening.   More than a month    lisdexamfetamine (VYVANSE) 50 MG capsule Take 1 capsule by mouth Every Morning 14 capsule 0 More than a month    Semaglutide-Weight Management (Wegovy) 0.25 MG/0.5ML solution auto-injector Inject 0.25 mg under the skin into the appropriate area as directed 1 (One) Time Per Week. 2 mL 0     sodium-potassium-magnesium sulfates (Suprep Bowel Prep Kit) 17.5-3.13-1.6 GM/177ML solution oral solution Take 2 bottles by mouth Take As Directed. Do not eat the day before your procedure. If you didn't receive instructions call [phone]. 354 mL 0     triamcinolone (KENALOG) 0.1 % ointment Apply 1 application topically to the appropriate area as directed 2 (Two) Times a Day. 80 g 1 More than a month    Turmeric (QC TUMERIC COMPLEX PO) Take 1 capsule by mouth Daily.   More than a month       Past Medical History:   Diagnosis Date    Allergies     Anxiety     Arthritis     left ankle    Carpal tunnel syndrome on right     Depression     Fracture, fibula 2024    Knee swelling 10/22    Overactive bladder     Tear of meniscus of knee 10/22     Past Surgical History:   Procedure Laterality Date    BILATERAL BREAST REDUCTION      BREAST BIOPSY Left     in [place]     SECTION       and     INJECTION OF MEDICATION Left 2023    hylauronic acid in knee    LIPOMA EXCISION      under right arm    STEROID INJECTION Left 10/2023    knee    TRIGGER POINT INJECTION  10/22     Family History   Problem Relation Age of Onset    Heart disease Mother         carotid aneyrysm     Hypertension Mother     Hyperlipidemia Mother      "Obesity Mother     Heart disease Father     Diabetes Father     Hypertension Father     Hyperlipidemia Father     Stroke Father     Obesity Father     Arthritis Sister     Hypertension Sister     Mental illness Sister     Obesity Sister     Tuberculosis Maternal Grandmother     Stroke Paternal Grandmother 54    Diabetes Sister      Social History     Tobacco Use    Smoking status: Never     Passive exposure: Never    Smokeless tobacco: Never   Vaping Use    Vaping Use: Never used   Substance Use Topics    Alcohol use: Yes     Alcohol/week: 2.0 standard drinks of alcohol     Types: 2 Glasses of wine per week     Comment: hard apple cider and wine    Drug use: No       Review of Systems  Review of systems could not be obtained due to   patient sedation status.    Vital Signs  /68   Pulse 89   Temp 98.1 °F (36.7 °C)   Resp 16   Ht 162.6 cm (64\")   Wt 104 kg (230 lb)   SpO2 100%   BMI 39.48 kg/m²     Physical Exam:    General Appearance:    Drowsy postop    Head:    Normocephalic, without obvious abnormality, atraumatic   Eyes:            Lids and lashes normal, conjunctivae and sclerae normal, no   icterus, no pallor, corneas clear,    Ears:    Ears appear intact with no abnormalities noted   Throat:   No oral lesions, no thrush, oral mucosa moist   Neck:   No adenopathy, supple, trachea midline, no thyromegaly         Lungs:     Clear to auscultation,respirations regular, even and    unlabored    Heart:    Regular rhythm and normal rate, normal S1 and S2, no   murmur, no gallop   Abdomen:     Normal bowel sounds, no masses, no organomegaly, soft        non-tender, non-distended, no guarding, no rebound                 tenderness   Genitalia:    Deferred   Extremities: Splint/dressing clean dry intact on left leg.  Peripheral nerve block catheter present, popliteal infublock.   Pulses:   Pulses palpable and equal bilaterally   Skin:   No bleeding, bruising or rash   Neurologic:   Cranial nerves 2 - " "12 grossly intact, limited exam due to sedation status       I reviewed the patient's new clinical results.       Results from last 7 days   Lab Units 01/30/24  1044   WBC 10*3/mm3 5.82   HEMOGLOBIN g/dL 13.5   HEMATOCRIT % 43.7   PLATELETS 10*3/mm3 306     Results from last 7 days   Lab Units 01/30/24  1044   SODIUM mmol/L 138   POTASSIUM mmol/L 4.2   CHLORIDE mmol/L 102   CO2 mmol/L 26.0   BUN mg/dL 14   CREATININE mg/dL 0.77   CALCIUM mg/dL 9.5   GLUCOSE mg/dL 95     No results found for: \"HGBA1C\"        Assessment and Plan:       S/P ORIF (open reduction internal fixation) fracture, left ankle, 1/30/24    Depression    Anxiety    Overactive bladder    Other hyperlipidemia    Closed fracture of left ankle    Obesity  GERD    Plan  1. PT/OT.  Nonweightbearing left lower extremity  2. Pain control-prns, peripheral nerve block, multimodal approach.   3. IS-encourage  4. DVT proph- mechanicals and aspirin.  5. Bowel regimen  6. Resume home medications as appropriate  7. Monitor post-op labs  8. DC planning for home.    -Anxiety and depression: Resume Zoloft and as needed Atarax.    -Dyslipidemia:  Resume home regimen statin.  ( formulary substitution when appropriate).    -Overactive bladder: Formulary substitution for patient's home Myrbetriq.  Oxybutynin while hospitalized.     -GERD:  Resume PPI.  Formulary substitution when indicated.    Dragon disclaimer:  Part of this encounter note is an electronic transcription/translation of spoken language to printed text. The electronic translation of spoken language may permit erroneous, or at times, nonsensical words or phrases to be inadvertently transcribed; Although I have reviewed the note for such errors, some may still exist.    Jamal Mcmanus MD  01/30/24  15:10 EST          "

## 2024-01-30 NOTE — OP NOTE
ORTHOPAEDIC SURGERY OPERATIVE REPORT    Location of Surgery: [facility]    Patient Name:  Liya Sales  YOB: 1965    Date of Surgery:  1/30/2024     Pre-op Diagnosis:   Closed fracture of left ankle, initial encounter [S82.892A]    Post-op Diagnosis:      Post-Op Diagnosis Codes:     * Closed fracture of left ankle, initial encounter [S82.892A]    Procedure/CPT® Codes:  1. ORIF lateral maleolus, 51328   2. ORIF Syndesmosis, 74377  3. Stress fluoroscopy ankle 61629  4. Application short leg splint, 91179     Staff:  Surgeon(s):  Jasvir Tucker MD  Assistant: Caryn Schroeder PA-C    Anesthesia: General with Block    Estimated Blood Loss: minimal    Specimen:          None    Complications:   None    CLINICAL HISTORY:  Liya Sales is a 58 y.o. female with the above condition. Discussed operative and non-operative treatment options and risks including but not limited to pain, infection, bleeding, damage to surrounding structures, and need for additional procedures.  After all questions were fully answered, Liya Sales understood the risks and elected to proceed, and informed consent was obtained. The patient was marked with indelible marking pen after verifying correct side, site, and procedure.      DESCRIPTION OF PROCEDURE:   The patient was identified in the pre-operative area where correct procedure, site, and patient were verified. The patient was brought to the operating theater in stable condition and was transferred to the operative table where they remained in the supine position for the entirety of the case. Preoperative timeout was taken to ensure the correct identity, operative procedure, and location. General anesthesia was then administered. The patient received appropriate weight based IV antibiotics within 30 minutes of skin incision.  All bony prominences well-padded. The left leg was then prepped and draped in the standard sterile fashion.  After Esmarch exsanguination, the tourniquet was inflated.     A lateral incision over the fibula was made. Blunt dissection was carried through the subcutaneous tissues down to the periosteal layer, taking care to identify any branches of the superficial peroneal nerve in the field.  The fracture site was identified and the edges cleaned in preparation for reduction.    The fibula was reduced and held with temporary instrumentation.  The reduction was checked on fluoroscopy.  The fracture was lagged with one 2.7 mm screw.  An Arthrex distal fibular locking plate was selected and placed on the fibula.  Plate position was confirmed by fluoroscopy and the plate was affixed to the fibula with 4 locking screws distally and 1 nonlocking screw and 2 locking screws proximally.    An intraoperative stress test under fluorscopy was performed which demonstrated that the syndesmosis was disrupted and the ankle unstable     Syndesmotic fixation was achieved with 1 Arthrex suture button fixation device.      The tourniquet was released before 120 minutes had elapsed.  Hemostasis was awake achieved with electrocautery.     At this point we irrigated all wounds with sterile saline solution.  The periosteal layers were closed with 2-0 Monocryl suture. Subcutaneous layers were closed with 3-0 Monocryl.  The skin was closed with 3-0 nylon.  A sterile dressing was applied followed by well-padded 3-way splint.  The ankle was splinted in neutral.      The patient tolerated the procedure well no with acute complications identified.  All sponge & needle counts were correct x2 prior to procedure conclusion.  Patient was awoken from anesthesia and transferred back to the PACU without complication.     Counts:    Sponge: Correct    Needle: Correct    Sharp: Correct    Instrument: Correct     POSTOPERATIVE PLAN:   -Admit  to floor  -NWB operative extremity  -Advance diet as tolerated  -Keep splint/operative dressing clean and dry  -DVT  prophylaxis, mechanical and ASA, home on ASA  -Postoperative antibiotics  -Pain control  -PT/OT  -Elevate operative extremity  -Consult medicine, appreciate recs  -Likely plan for discharge tomorrow     Upon DC, patient is to be NWB in splint. Plan to see patient in clinic in 2-3 weeks for postop follow up. Plan for suture removal at 2-3 week follow up visit. 3 view simulated WB X-rays of ankle at follow up visit (splint removed for xray). Patient to be placed in NWB cast at first postop visit and remain NWB for 6 additional weeks. See patient again at 8 weeks postop for wound check and XRs (3 view WB X-rays of ankle) and placement in tall CAM boot. Starting at 8 weeks post-op, patient can begin WBAT in tall CAM boot for an additional 4 weeks. Plan for physical therapy to start at 8 weeks postop (ANKLE FRACTURE ORIF REHAB PROTOCOL SLOW). At 12 weeks transition into supportive shoewear with brace.     Implants:    Implant Name Type Inv. Item Serial No.  Lot No. LRB No. Used Action   SCRW MORE LP SS 2.7X18MM - [id number] Implant SCRW MORE LP SS 2.7X18MM  ARTH[employer] NA Left 2 Implanted   PLT FIB/ANKL LK DIST 6HL 104MM LT - [id number] Implant PLT FIB/ANKL LK DIST 6HL 104MM LT  ARTHREX NA Left 1 Implanted   SCRW MORE NL L/P SS 2.7X20MM - [id number] Implant SCRW MORE NL L/P SS 2.7X20MM  AR[employer]X NA Left 1 Implanted   SCRW COMPR KREULOCK LK FUL/THRD SS 2.7X16MM - [id number] Implant SCRW COMPR KREULOCK LK FUL/THRD SS 2.7X16MM  ARTHREX  Left 2 Implanted   SCRW MORE LP SS 3.5X12MM - [id number] Implant SCRW MORE LP SS 3.5X12MM  ARTHREX  Left 1 Implanted   SCRW COMPR KREULOCK LK FUL/THRD SS 2.7X12MM - [id number] Implant SCRW COMPR KREULOCK LK FUL/THRD SS 2.7X12MM  ARTHREX  Left 1 Implanted   SCRW COMPR KREULOCK LK FUL/THRD SS 3.5X14MM - [id number] Implant SCRW COMPR KREULOCK LK FUL/THRD SS 3.5X14MM  ARTHREX  Left 3 Implanted   SYS SYNDESMOSIS REPR MEAGHAN WHITE/ROXANE BAIG SS - [id number] Implant SYS SYNDESMOSIS REPR  MEAGHAN TIGHTROPE/XP KNOTLS   ARTHREX [id number] Left 1 Implanted       Assistant: Caryn Schroeder PA-C was responsible for performing the following activities: Retraction, Suction, Irrigation, Suturing, Closing, and Placing Dressing and their skilled assistance was necessary for the success of this case.     Jasvir Tucker MD     Date: 1/30/2024  Time: 15:11 EST  Electronically signed by Jasvir Tucker MD, 01/30/24, 3:11 PM EST.

## 2024-01-30 NOTE — ANESTHESIA PROCEDURE NOTES
Airway  Urgency: elective    Date/Time: 1/30/2024 12:23 PM  Airway not difficult    General Information and Staff    Patient location during procedure: OR  CRNA/CAA: Daniel Gan CRNA    Indications and Patient Condition  Indications for airway management: airway protection    Preoxygenated: yes  MILS not maintained throughout  Mask difficulty assessment: 2 - vent by mask + OA or adjuvant +/- NMBA    Final Airway Details  Final airway type: endotracheal airway      Successful airway: ETT  Cuffed: yes   Successful intubation technique: video laryngoscopy  Facilitating devices/methods: intubating stylet and cricoid pressure  Endotracheal tube insertion site: oral  Blade: Falk  Blade size: 3  ETT size (mm): 7.0  Cormack-Lehane Classification: grade I - full view of glottis  Placement verified by: chest auscultation and capnometry   Cuff volume (mL): 10  Measured from: lips  ETT/EBT  to lips (cm): 22  Number of attempts at approach: 1  Assessment: lips, teeth, and gum same as pre-op and atraumatic intubation    Additional Comments  Negative epigastric sounds, Breath sound equal bilaterally with symmetric chest rise and fall

## 2024-01-30 NOTE — ANESTHESIA POSTPROCEDURE EVALUATION
Patient: Liya Sales    Procedure Summary       Date: 01/30/24 Room / Location:  [facility]  /  [facility]    Anesthesia Start: 1215 Anesthesia Stop: 1500    Procedure: ANKLE OPEN REDUCTION INTERNAL FIXATION, SYNDESMOTIC FIXATION - LEFT (Left: Ankle) Diagnosis:       Closed fracture of left ankle, initial encounter      (Closed fracture of left ankle, initial encounter [S82.892A])    Surgeons: Jasvir Tucker MD Provider: Rody Quintero MD    Anesthesia Type: general with block ASA Status: 3            Anesthesia Type: general with block    Vitals  Vitals Value Taken Time   /72    Temp 97    Pulse 93 01/30/24 1500   Resp 18    SpO2 100 % 01/30/24 1500   Vitals shown include unfiled device data.        Post Anesthesia Care and Evaluation    Patient location during evaluation: PACU  Patient participation: complete - patient participated  Level of consciousness: awake and alert  Pain management: adequate    Airway patency: patent  Anesthetic complications: No anesthetic complications  PONV Status: none  Cardiovascular status: hemodynamically stable and acceptable  Respiratory status: nonlabored ventilation, acceptable and nasal cannula  Hydration status: acceptable

## 2024-01-30 NOTE — DISCHARGE INSTRUCTIONS
Jasvir Tucker MD  Orthopedic Foot & Ankle Surgeon  [facility]    Diagnosis: Closed fracture of left ankle  Procedure Performed: Procedure(s) (LRB):  ANKLE OPEN REDUCTION INTERNAL FIXATION, SYNDESMOTIC FIXATION - LEFT (Left)    What to Expect After Surgery  Diet after surgery:  Resume your diet gradually.  Begin with clear liquids and gradually progress as you feel ready.  Surgical Site Care:  Please remain in your post-operative dressing/splint until your first post op appointment with Dr. Tucker.  Please make sure to keep your post-operative dressing clean and dry.  Please use a shower bag (e.g., [email or web address]) when showering or bathing to keep things dry. Wet padding can cause skin breakdown.  Do not stick anything down your cast or splint.  If you sustain a scratch, you are at higher risk for infection as casts and splints harbor more bacteria.  Follow Up Appointment: Please call the [facility] at [phone] or Dr. Tucker's schedulers within two (2) days of your discharge to /confirm/verify your follow-up appointment date/time with Dr. Tucker.  Typically, Dr. Tucker will want to see you 14-21 days after surgery for a post-operative follow-up appointment - before 14 days, the sutures may have to stay in and you will need to return in the 14-21 day window again.  Please arrive ~15 minutes prior to your appointment time to take care of any paperwork.      Activity Precautions:  Elevate the leg above your heart as much as possible for the first two weeks after surgery.  If the leg is higher than your heart, gravity helps decrease swelling, decrease pain, and improve blood flow.  If the leg is below your heart, swelling increases, pain can worsen, and your wound is at greater risk of infection.  Keep all weight off of your surgical leg until cleared by your physician.  Use ice packs intermittently (20 minutes on, 20 minutes off) to reduce pain and swelling,  however, use extreme caution with icing if your block is still in effect.  Make sure to have a barrier between your skin and the ice pack to avoid frost bite.   If you are in a post-operative splint, you can wiggle your toes to help push swelling to your lymph ducts, but do not try to move your ankle.      Pain Management  Nerve Blocks:  to help control pain after surgery, you may have received a nerve block where the anesthesiologist injected numbing medication around the nerves that send pain signals from your foot or ankle to your brain.  This helps you feel little to no pain.   The time a nerve block lasts varies from person to person.  Often, they will last between 12 and 24 hours but could last days.  You may not be able to move your foot and/or ankle during this time.  As the block medication wears off, you may feel a tingling sensation as the nerves start to wake up.  Keep your foot and ankle safe while it is numb.  Avoid excessive heating,  scratching, etc. until the block has completely worn off.  If icing the ankle or foot, watch the toes closely to ensure that they do not become discolored (i.e., white, red or blue)  Even if you still feel no pain in your leg before you go to bed, take a dose of your narcotic medication before you go to sleep.  You do not want to wake up with the block having worn off and being behind on pain control - it is quite difficult to 'catch up' again.  Pain Medication:    Acetaminophen (Tylenol) 500 mg tablets are typically your baseline pain medication.  Take this tablet up to once every 4 hours. Do not exceed 3,000 mg of acetaminophen daily.  You have been provided Oxycodone immediate release tablets as your initial narcotic pain reliever. Take doses of this medication if you are having severe breakthrough pain uncontrolled by the Tylenol alone. Typically, patients are taking it every 4 hours for the first day or two after surgery.  Actively wean down your use of this  medication after the third day after surgery.  Note that it takes about 30-45 minutes for oral pain medication to take effect.  DO NOT drink alcoholic beverages, use recreational drugs, or use prescription sedative medications when taking pain medication.  DO NOT operate heavy machinery/drive while taking opioids.  To avoid the risk of nausea, please make sure that you are taking your medication with food.  You may experience light headedness while taking your pain medication.  Make sure you drink plenty of water while taking narcotic pain medication to stay well hydrated and help avoid constipation.    You have been provided a Docusate prescription to help with constipation that can be a side effect of taking narcotics.  Take that medication as needed.  You have been provided a Zofran prescription to help with nausea that you can take as needed.  Itching is a common side effect to pain medication usage.  If you have an associated rash with the itching, please contact your provider.       When to Call Your Physician  Common or Normal Post-Operative Reactions:  Low grade fever (approximately 100.5 degrees) for up to one week.  Small amount of blood or fluid leaking from the surgical site or dressing  Bruising along the surgical extremity  Swelling around the surgical site and surrounding area  The reactions listed above are NORMAL, but you want to call your surgeon if any of these reactions persist.  Symptoms to Report:  Please report any of the following signs to your surgeon:  Signs of infection:  Redness or pain around your incision (if you cannot see your incision, red streaking up your extremity should be reported).  Thick, dark yellow or foul-smelling drainage at the incision site or from your dressing.  Temperature measured over 101.5 degrees for more than 24 hours despite Tylenol.  Signs of Decreased Circulation to the Ankle and Foot:  Coldness of ankle and foot  Foot or leg turning pale  Tingling/numbness  (after the block has fully resolved)  Sustained increases in pain uncontrolled by medication  Toenail bed turns blue in color  Blood Clots:  Although rare, please be aware and utilize the recommendations below to avoid getting a blood clot.  Prevention of deep vein thrombus DVT (blood clots):  You have been given a prescription for daily Aspirin to help prevent blood clot formation.  Get up 4-5 times during the daytime and walk/crutch/walker across the room to keep the blood circulating in your legs. (Maintain any weightbearing restrictions, of course)  Wiggle your toes frequently if you are in a splint or case  Notify your physician if you are a nicotine user, on hormone replacement therapy medications, are taking birth control, or have a history of blood clots as these can increase your risk of developing a blood clot.  Notify your provider if you develop pain or tenderness in your calf muscle    If you have any additional questions, please contact Dr. Tucker's staff the [facility] at [phone]    IF YOU HAVE AN EMERGENCY, i.e., SHORTNESS OF BREATH, CHEST PAIN, OR SYMPTOMS SEVERE IN NATURE, PLEASE CALL 911 OR VISIT YOUR LOCAL EMERGENCY ROOM “I received and reviewed a copy of the [facility] Joint Replacement Guide, understand the individualized plan of care and self-management training program developed and do not have any questions.” InfuBLOCK - Patient Information    What is a pain pump?  InfuBLOCK is a postoperative, non-narcotic pain relief system that delivers local anesthetic to or near the surgical site. This is a pain minimizing therapy that delivers an anesthetic (numbing) medicine to the nerve.    The InfuBLOCK pain pump will continuously deliver a local anesthetic medication to block the pain in the area of your procedure.    Where can I find information about my pain pump?           For more information about your pain pump, scan the QR code.  For additional  patient resources, visit [email or web address].                                                                                             The [employer] Nursing Hotline is Here for You 24/7.     Call [phone] for Assistance.  While your physician is your primary source for information about your treatment., there may be times during your treatment that you need assistance with your infusion pump. Our team of compassionate and knowledgeable Registered Nursed (RN) is here to assist every step of the way.    Answers to questions about your infusion pump                 Tubing disconnect  Assistance with pump alarms                                                      Dislodged catheter  Excessive leakage noted from pump                                         Inadequate pain control   Nerve Catheter Removal Instructions  When your device is empty:    Remove your catheter by pulling the dressing off slowly (like you would remove a regular bandage). The catheter should pull right out of the skin.  Check that the BLUE tip is intact.                                                                                     If the catheter is stuck, reposition your   extremity and pull slowly until removed.  *If catheter is HURTING and WON'T come out, stop and call [phone] for further assistance.    Remove medication bag from the black carrying case.  Cut the tubing on right and left side of pump, and discard the medication bag and tubing into garbage.  Place the pump and black carrying case into the plastic bag and then place this into the return box.  Seal box with blue stickers and return to [place] postal service.    THIS IS PRE-PAID POSTAGE. SMI COLD THERAPY - PATIENT INSTRUCTION SHEET    Cold Compression Therapy for your comfort and rehabilitation  Your caregivers want you to be productive in your rehab and comfortable during your stay. In keeping with those goals, you will be receiving an SMI  Cold Therapy Wrap to help ease post-operative pain and swelling that might keep you from getting back on track! Your SMI Cold Therapy Wrap is effective and simple-to-use, and you will be encouraged to apply it throughout your hospital stay and at home through the duration of your recovery.    When you are ready to go home  Be sure to take your SMI Cold Therapy Wrap and both sets of Gel Bags with you for continued comfort and use throughout your rehabilitation. If you don't already have them, ask your nurse or aide to retrieve your SMI Gel Bags from the patient freezer.    Home use precautions  Always follow your medical professional's application instructions upon discharge. Your SMI Cold Therapy Wrap and Gel Bags are designed to last for months following your surgery. Never heat the Gel Bags unless specified by your healthcare provider. Supervision is advised when using this product on children or geriatric patients. To avoid danger of suffocation, please keep the outer plastic packaging away from children & pets.    Cold Therapy Instructions  Place Gel Bags in a freezer set ¾ of the way to max temperature for at least (4) hours. For best results, lay the Gel Bags flat and oxil-[id number] in the freezer. Once frozen, slide Gel Bags into the gel pouch and secure your wrap to the affected area with the straps.  Gel wraps that have been stored in a freezer for an extended period of time may require a (10) minute period of softening up in a room temperature environment before application.  The gel pouch acts as a protective barrier. NEVER place frozen bags directly onto skin, as this may cause frostbite injury.  The SMI Cold Therapy Wrap is designed to be able to be worm while ambulating. The compression straps can be secured well enough so that the Wrap won't fall off while moving.  Wrap Application Videos can be viewed at [email or web address].  An additional protective barrier such as clothing, a washcloth,  hand-towel or pillowcase may be used during prolonged treatment applications.  The Gel-Pouch and Wrap are both Latex-Free and the Gel Bag ingredients are non toxic.    [facility] Wrap care instructions  The [facility] Cold Therapy Wrap may be hand washed and hung to dry when needed.    [facility] re-order information  Additional [facility] body specific wraps and/or Gel Bags can be re-ordered from [email or web address] or call [employer] ([phone])    Nerve Catheter Removal Instructions  When your device is empty:    Remove your catheter by pulling the dressing off slowly (like you would remove a regular bandage). The catheter should pull right out of the skin.  Check that the BLUE tip is intact.                                                                                     If the catheter is stuck, reposition your   extremity and pull slowly until removed.  *If catheter is HURTING and WON'T come out, stop and call [phone] for further assistance.    Remove medication bag from the black carrying case.  Cut the tubing on right and left side of pump, and discard the medication bag and tubing into garbage.  Place the pump and black carrying case into the plastic bag and then place this into the return box.  Seal box with blue stickers and return to [place] postal service. THIS IS PRE-PAID POSTAGE.

## 2024-01-30 NOTE — THERAPY EVALUATION
Patient Name: Liya Sales  : 1965    MRN: [id number]                              Today's Date: 2024       Admit Date: 2024    Visit Dx:     ICD-10-CM ICD-9-CM   1. Examination for normal comparison for clinical research  Z00.6 V70.7   2. Closed fracture of left ankle, initial encounter  S82.892A 824.8     Patient Active Problem List   Diagnosis    Depression    Anxiety    Allergies    Arthritis    Carpal tunnel syndrome on right    Overactive bladder    Other hyperlipidemia    Gastroesophageal reflux disease    Closed fracture of left ankle    Trimalleolar fracture of ankle, closed, left, initial encounter    Obesity    S/P ORIF (open reduction internal fixation) fracture, left ankle, 24    Ankle fracture     Past Medical History:   Diagnosis Date    Allergies     Anxiety     Arthritis     left ankle    Carpal tunnel syndrome on right     Depression     Fracture, fibula 2024    Knee swelling 10/22    Overactive bladder     Tear of meniscus of knee 10/22     Past Surgical History:   Procedure Laterality Date    BILATERAL BREAST REDUCTION      BREAST BIOPSY Left     in [place]     SECTION       and     INJECTION OF MEDICATION Left 2023    hylauronic acid in knee    LIPOMA EXCISION      under right arm    STEROID INJECTION Left 10/2023    knee    TRIGGER POINT INJECTION  10/22      General Information       Row Name 24 1744          Physical Therapy Time and Intention    Document Type evaluation  -     Mode of Treatment physical therapy  -       Row Name 24 1744          General Information    Patient Profile Reviewed yes  -HW     Prior Level of Function independent:;all household mobility;community mobility;gait;transfer;bed mobility;ADL's  prior to fall 2023 pt was Ind  -HW     Existing Precautions/Restrictions fall;non-weight bearing;other (see comments)  popliteal nerve cath  -HW     Barriers to Rehab environmental barriers   -       Row Name 01/30/24 1744          Living Environment    People in Home child(john), adult;spouse  -       Row Name 01/30/24 1744          Home Main Entrance    Number of Stairs, Main Entrance three;other (see comments)  1+1+1 +ramp  -       Row Name 01/30/24 1744          Stairs Within Home, Primary    Number of Stairs, Within Home, Primary none  -       Row Name 01/30/24 1744          Cognition    Orientation Status (Cognition) oriented x 3  -       Row Name 01/30/24 1744          Safety Issues, Functional Mobility    Safety Issues Affecting Function (Mobility) insight into deficits/self-awareness;awareness of need for assistance;safety precaution awareness  -     Impairments Affecting Function (Mobility) balance;endurance/activity tolerance;pain;strength;sensation/sensory awareness;range of motion (ROM);motor control  -               User Key  (r) = Recorded By, (t) = Taken By, (c) = Cosigned By      Initials Name Provider Type     Hina Mahoney PT Physical Therapist                   Mobility       Row Name 01/30/24 1806          Bed Mobility    Bed Mobility supine-sit  -     Supine-Sit [place] (Bed Mobility) standby assist  -     Assistive Device (Bed Mobility) bed rails;head of bed elevated  -     Comment, (Bed Mobility) VC for sequencing and to avoid pushing through L LE to scoot towards EOB  -       Row Name 01/30/24 1806          Transfers    Comment, (Transfers) Pt performed STS and SPT to chair with FWW and CGA. VC for hand placement, sequencing, and keeping FWW close for best BUE advantage. Pt able to take small hop on RLE but did best when pivoting on R foot to turn. VC for reaching back for chair when sitting for safety. Pt impulsive with sitting but verbalized understanding afterwards. Activity limited by generalized weakness. Pt has a knee scooter and used it intermittently prior to surgery though unable to amb far secondary to L knee pain.  -       Row Name  01/30/24 1806          Bed-Chair Transfer    Bed-Chair Simon (Transfers) contact guard;nonverbal cues (demo/gesture);verbal cues  -     Assistive Device (Bed-Chair Transfers) walker, front-wheeled  -       Row Name 01/30/24 1806          Sit-Stand Transfer    Sit-Stand Simon (Transfers) contact guard;nonverbal cues (demo/gesture);verbal cues  -     Assistive Device (Sit-Stand Transfers) walker, front-wheeled  -       Row Name 01/30/24 1806          Mobility    Extremity Weight-bearing Status left lower extremity  -     Left Lower Extremity (Weight-bearing Status) non weight-bearing (NWB)  -               User Key  (r) = Recorded By, (t) = Taken By, (c) = Cosigned By      Initials Name Provider Type     Hina Mahoney PT Physical Therapist                   Obj/Interventions       [facility] Name 01/30/24 1811          Range of Motion Comprehensive    General Range of Motion lower extremity range of motion deficits identified  -     Comment, General Range of Motion L ankle ROM limited by splint  -[facility] Name 01/30/24 1811          Strength Comprehensive (MMT)    General Manual Muscle Testing (MMT) Assessment lower extremity strength deficits identified  -     Comment, General Manual Muscle Testing (MMT) Assessment Pt able to perform B active SLR and wiggle L toes  -       Row Name 01/30/24 1811          Balance    Balance Assessment sitting static balance;sitting dynamic balance;sit to stand dynamic balance;standing static balance;standing dynamic balance  -     Static Sitting Balance standby assist  -     Dynamic Sitting Balance standby assist  -     Position, Sitting Balance sitting edge of bed  -     Static Standing Balance contact guard  -     Dynamic Standing Balance contact guard  -     Position/Device Used, Standing Balance supported;walker, rolling  -     Balance Interventions sitting;standing;sit to stand;occupation based/functional task  -       Row Name  01/30/24 1811          Sensory Assessment (Somatosensory)    Sensory Assessment (Somatosensory) left LE;right-sided sensation intact  -HW     Left LE Sensory Assessment general sensation;impaired  -HW               User Key  (r) = Recorded By, (t) = Taken By, (c) = Cosigned By      Initials Name Provider Type    HW Hina Mahoney, PT Physical Therapist                   Goals/Plan       Row Name 01/30/24 1814          Bed Mobility Goal 1 (PT)    Activity/Assistive Device (Bed Mobility Goal 1, PT) sit to supine/supine to sit  -HW     [place] Level/Cues Needed (Bed Mobility Goal 1, PT) modified independence  -HW     Time Frame (Bed Mobility Goal 1, PT) long term goal (LTG);5 days  -HW     Progress/Outcomes (Bed Mobility Goal 1, PT) goal ongoing  -       Row Name 01/30/24 1814          Transfer Goal 1 (PT)    Activity/Assistive Device (Transfer Goal 1, PT) sit-to-stand/stand-to-sit;bed-to-chair/chair-to-bed  -HW     [place] Level/Cues Needed (Transfer Goal 1, PT) modified independence  -HW     Time Frame (Transfer Goal 1, PT) long term goal (LTG);5 days  -       Row Name 01/30/24 1814          Gait Training Goal 1 (PT)    Activity/Assistive Device (Gait Training Goal 1, PT) gait (walking locomotion)  -HW     [place] Level (Gait Training Goal 1, PT) modified independence  -HW     Distance (Gait Training Goal 1, PT) 50  -HW     Time Frame (Gait Training Goal 1, PT) long term goal (LTG);5 days  -       Row Name 01/30/24 1814          Stairs Goal 1 (PT)    Activity/Assistive Device (Stairs Goal 1, PT) ascending stairs;descending stairs  -HW     [place] Level/Cues Needed (Stairs Goal 1, PT) contact guard required  -HW     Number of Stairs (Stairs Goal 1, PT) 3  -HW     Time Frame (Stairs Goal 1, PT) long term goal (LTG);5 days  -       Row Name 01/30/24 1814          Therapy Assessment/Plan (PT)    Planned Therapy Interventions (PT) balance training;bed mobility training;gait training;home exercise  program;patient/family education;transfer training;stretching;strengthening;stair training;ROM (range of motion);wheelchair management/propulsion training  -               User Key  (r) = Recorded By, (t) = Taken By, (c) = Cosigned By      Initials Name Provider Type     Hina Mahoney, DURGA Physical Therapist                   Clinical Impression       [facility] Name 01/30/24 1812          Pain    Pretreatment Pain Rating 0/10 - no pain  -     Posttreatment Pain Rating 0/10 - no pain  -[facility] Name 01/30/24 1812          Plan of Care Review    Plan of Care Reviewed With patient;spouse  -     Progress no change  -     Outcome Evaluation Pt performed STS and SPT to chair with FWW and CGA. VC for sequencing and hand placement. No LOB noted. Activity limited by generalized fatigue. May trial knee scooter next session though pt reported she has had difficulty tolerating scooter due to L knee pain. Recommend d/c home with assist and HHPT when medically appropriate and cleared by PT.  -[facility] Name 01/30/24 1812          Therapy Assessment/Plan (PT)    Rehab Potential (PT) good, to achieve stated therapy goals  -     Criteria for Skilled Interventions Met (PT) yes;meets criteria;skilled treatment is necessary  -     Therapy Frequency (PT) daily  -[facility] Name 01/30/24 1812          Vital Signs    Pre Patient Position Supine  -     Intra Patient Position Standing  -     Post Patient Position Sitting  -[facility] Name 01/30/24 1812          Positioning and Restraints    Pre-Treatment Position in bed  -     Post Treatment Position chair  -     In Chair notified nsg;reclined;sitting;call light within reach;encouraged to call for assist;exit alarm on;with family/caregiver;legs elevated;LLE elevated  -               User Key  (r) = Recorded By, (t) = Taken By, (c) = Cosigned By      Initials Name Provider Type     Hina Mahoney, DURGA Physical Therapist                   Outcome Measures       [facility]  Name 01/30/24 1814          How much help from another person do you currently need...    Turning from your back to your side while in flat bed without using bedrails? 4  -HW     Moving from lying on back to sitting on the side of a flat bed without bedrails? 4  -HW     Moving to and from a bed to a chair (including a wheelchair)? 3  -HW     Standing up from a chair using your arms (e.g., wheelchair, bedside chair)? 3  -HW     Climbing 3-5 steps with a railing? 2  -HW     To walk in hospital room? 2  -HW     AM-PAC 6 Clicks Score (PT) 18  -HW     Highest Level of Mobility Goal 6 --> Walk 10 steps or more  -       Row Name 01/30/24 1814          Functional Assessment    Outcome Measure Options AM-PAC 6 Clicks Basic Mobility (PT)  -               User Key  (r) = Recorded By, (t) = Taken By, (c) = Cosigned By      Initials Name Provider Type     Hina Mahoney PT Physical Therapist                                 Physical Therapy Education       Title: PT OT SLP Therapies (In Progress)       Topic: Physical Therapy (In Progress)       Point: Mobility training (Done)       Learning Progress Summary             Patient Acceptance, E,D, VU,NR by  at 1/30/2024 1815                         Point: Home exercise program (Not Started)       Learner Progress:  Not documented in this visit.              Point: Body mechanics (Done)       Learning Progress Summary             Patient Acceptance, E,D, VU,NR by  at 1/30/2024 1815                         Point: Precautions (Done)       Learning Progress Summary             Patient Acceptance, E,D, VU,NR by  at 1/30/2024 1815                                         User Key       Initials Effective Dates Name Provider Type Discipline     12/15/23 -  Hina Mahoney PT Physical Therapist PT                  PT Recommendation and Plan  Planned Therapy Interventions (PT): balance training, bed mobility training, gait training, home exercise program, patient/family education,  transfer training, stretching, strengthening, stair training, ROM (range of motion), wheelchair management/propulsion training  Plan of Care Reviewed With: patient, spouse  Progress: no change  Outcome Evaluation: Pt performed STS and SPT to chair with FWW and CGA. VC for sequencing and hand placement. No LOB noted. Activity limited by generalized fatigue. May trial knee scooter next session though pt reported she has had difficulty tolerating scooter due to L knee pain. Recommend d/c home with assist and HHPT when medically appropriate and cleared by PT.     Time Calculation:   PT Evaluation Complexity  History, PT Evaluation Complexity: 1-2 personal factors and/or comorbidities  Examination of Body Systems (PT Eval Complexity): total of 3 or more elements  Clinical Presentation (PT Evaluation Complexity): stable  Clinical Decision Making (PT Evaluation Complexity): low complexity  Overall Complexity (PT Evaluation Complexity): low complexity     PT Charges       Row Name 01/30/24 1815             Time Calculation    Start Time 1715  -HW      PT Received On 01/30/24  -HW      PT Goal Re-Cert Due Date 02/09/24  -         Timed Charges    87318 - PT Therapeutic Activity Minutes 8  -HW         Untimed Charges    PT Eval/Re-eval Minutes 37  -HW         Total Minutes    Timed Charges Total Minutes 8  -HW      Untimed Charges Total Minutes 37  -HW       Total Minutes 45  -HW                User Key  (r) = Recorded By, (t) = Taken By, (c) = Cosigned By      Initials Name Provider Type     Hina Mahoney PT Physical Therapist                  Therapy Charges for Today       Code Description Service Date Service Provider Modifiers Qty    74966734691 HC PT THERAPEUTIC ACT EA 15 MIN 1/30/2024 Hina Mahoney, PT GP 1    91284872615 HC PT EVAL LOW COMPLEXITY 3 1/30/2024 Hina Mahoney, PT GP 1            PT G-Codes  Outcome Measure Options: AM-PAC 6 Clicks Basic Mobility (PT)  AM-PAC 6 Clicks Score (PT): 18  PT Discharge  Summary  Anticipated Discharge Disposition (PT): home with assist, home with home health    Hina Mahoney, PT  1/30/2024

## 2024-01-30 NOTE — ANESTHESIA PROCEDURE NOTES
Peripheral Block    Pre-sedation assessment completed: 1/30/2024 10:19 AM    Patient reassessed immediately prior to procedure    Patient location during procedure: pre-op  Start time: 1/30/2024 11:30 AM  Reason for block: at surgeon's request and post-op pain management  Performed by  CRNA/CAA: Cornelio Gauthier, CRNA  Assisted by: Mae Ralph RN  Preanesthetic Checklist  Completed: patient identified, IV checked, site marked, risks and benefits discussed, surgical consent, monitors and equipment checked, pre-op evaluation and timeout performed  Prep:  Sterile barriers:cap, gloves, mask and washed/disinfected hands  Prep: ChloraPrep  Patient monitoring: blood pressure monitoring, continuous pulse oximetry and EKG  Procedure    Sedation: yes  Performed under: local infiltration  Guidance:ultrasound guided    ULTRASOUND INTERPRETATION.  Using ultrasound guidance a 20 G gauge needle was placed in close proximity to the nerve, at which point, under ultrasound guidance anesthetic was injected in the area of the nerve and spread of the anesthesia was seen on ultrasound in close proximity thereto.  There were no abnormalities seen on ultrasound; a digital image was taken; and the patient tolerated the procedure with no complications. Images:still images obtained, printed/placed on chart    Laterality:left  Block Type:popliteal  Injection Technique:catheter  Needle Type:echogenic and Tuohy  Needle Gauge:18 G  Resistance on Injection: none  Catheter Size:20 G  Cath Depth at skin: 7 cm    Medications Used: bupivacaine PF (MARCAINE) 0.25 % injection - Injection   20 mL - 1/30/2024 11:30:00 AM      Medications  Preservative Free Saline:10ml    Post Assessment  Injection Assessment: negative aspiration for heme, no paresthesia on injection and incremental injection  Patient Tolerance:comfortable throughout block  Complications:no  Additional Notes  CATHETER                               A high-frequency linear transducer, with  "sterile cover, was placed in the popliteal fossa to identify the popliteal artery and vein, Tibial nerve (TN) and Common Peroneal nerve (CP). The transducer was then moved in a cephalad fashion to observe the TN and CP nerve bifurcation to form the Sciatic Nerve. The insertion site was prepped and draped in sterile fashion. Skin and cutaneous tissue was infiltrated with 2-5 ml of 1% Lidocaine. Using ultrasound-guidance, an 18-gauge Contiplex Ultra 360 Touhy needle was advanced in plane from lateral to medial. Preservative-free normal saline was utilized for hydro-dissection of tissue, advancement of Touhy needle, and to confirm final needle placement posterior to the nerves. Local anesthetic injection spread, in incremental 3-5 ml injections, to surround both nerve structures. Aspiration every 5 ml to prevent intravascular injection. Injection was completed with negative aspiration of blood and negative intravascular injection. Injection pressures were normal with minimal resistance. A 20-gauge Contiplex Echo catheter was placed through the needle and advance out the tip of the Touhy 1-3 cm. The Touhy needle was then removed, and final catheter position verified (Below/above or Anterior/Posterior) the nerve structures. The catheter was secured in the usual fashion with skin glue, benzoin, steri-strips, CHG tegaderm and Label noting \"Nerve Block Catheter\". Jerk tape applied at yellow connector and catheter connection.              " Self

## 2024-01-30 NOTE — ANESTHESIA PROCEDURE NOTES
Peripheral Block    Pre-sedation assessment completed: 1/30/2024 10:20 AM    Patient reassessed immediately prior to procedure    Start time: 1/30/2024 11:21 AM  Reason for block: at surgeon's request and post-op pain management  Performed by  CRNA/CAA: Cornelio Gauthier, CRNA  Assisted by: Mae Ralph RN  Preanesthetic Checklist  Completed: patient identified, IV checked, site marked, risks and benefits discussed, surgical consent, monitors and equipment checked, pre-op evaluation and timeout performed  Prep:  Pt Position: supine  Sterile barriers:cap, gloves, mask, sterile barriers and washed/disinfected hands  Prep: ChloraPrep  Patient monitoring: blood pressure monitoring, continuous pulse oximetry and EKG  Procedure  Performed under: local infiltration  Guidance:ultrasound guided    ULTRASOUND INTERPRETATION.  Using ultrasound guidance a 20 G gauge needle was placed in close proximity to the nerve, at which point, under ultrasound guidance anesthetic was injected in the area of the nerve and spread of the anesthesia was seen on ultrasound in close proximity thereto.  There were no abnormalities seen on ultrasound; a digital image was taken; and the patient tolerated the procedure with no complications. Images:still images obtained, printed/placed on chart    Laterality:left  Block Type:adductor canal block  Injection Technique:single-shot  Needle Type:echogenic and short-bevel  Needle Gauge:18 G  Resistance on Injection: none  Catheter size: 20g.    Medications Used: bupivacaine PF (MARCAINE) 0.25 % injection - Injection   30 mL - 1/30/2024 11:21:00 AM  dexamethasone sodium phosphate injection - Injection   2 mg - 1/30/2024 11:21:00 AM  fentaNYL citrate (PF) (SUBLIMAZE) injection - Intravenous   100 mcg - 1/30/2024 11:21:00 AM      Medications  Preservative Free Saline:10ml    Post Assessment  Injection Assessment: negative aspiration for heme, incremental injection and no paresthesia on injection  Patient  [Breast Self Exam] : breast self exam "Tolerance:comfortable throughout block  Complications:no  Additional Notes  SINGLE shot   A high-frequency linear transducer, with sterile cover, was placed on the anterior mid-thigh (between the anterior superior iliac spine and patella). The transducer was then moved medially to identify the Sartorius muscle (Maurice), Vastus Medialis muscle (VMM), Superficial Femoral Artery (SFA) and Vein. The transducer was then moved cephalad or caudad to position the SFA in the middle of the Maurice. The insertion site was prepped and draped in sterile fashion. Skin and cutaneous tissue was infiltrated with 2-5 ml of 1% Lidocaine. Using ultrasound-guidance, a 20-gauge B-Morton 4\" Ultraplex 360 non-stimulating echogenic needle was advanced in plane from lateral to medial. Preservative-free normal saline was utilized for hydro-dissection of tissue, advancement of needle, and to confirm needle placement below the fascial plane of the Maurice where the Nerve to the VMM is located. Local anesthetic (LA) 5 ml deposited here. The needle continues its path lateral to the SFA at the level of the Saphenous Nerve. The remainder of the LA was deposited at the 10-11 o'clock position of the SFA. This injection created a space between the Maurice and the SFA. Aspiration every 5 ml to prevent intravascular injection. Injection was completed with negative aspiration of blood and negative intravascular injection. Injection pressures were normal with minimal resistance.             " [FreeTextEntry2] : exercise, calcium supplementation

## 2024-01-31 VITALS
OXYGEN SATURATION: 97 % | HEIGHT: 64 IN | HEART RATE: 79 BPM | DIASTOLIC BLOOD PRESSURE: 58 MMHG | RESPIRATION RATE: 17 BRPM | TEMPERATURE: 98.4 F | SYSTOLIC BLOOD PRESSURE: 102 MMHG | BODY MASS INDEX: 39.27 KG/M2 | WEIGHT: 230 LBS

## 2024-01-31 LAB
ANION GAP SERPL CALCULATED.3IONS-SCNC: 11 MMOL/L (ref 5–15)
BUN SERPL-MCNC: 13 MG/DL (ref 6–20)
BUN/CREAT SERPL: 15.5 (ref 7–25)
CALCIUM SPEC-SCNC: 9 MG/DL (ref 8.6–10.5)
CHLORIDE SERPL-SCNC: 102 MMOL/L (ref 98–107)
CO2 SERPL-SCNC: 26 MMOL/L (ref 22–29)
CREAT SERPL-MCNC: 0.84 MG/DL (ref 0.57–1)
DEPRECATED RDW RBC AUTO: 42.3 FL (ref 37–54)
EGFRCR SERPLBLD CKD-EPI 2021: 80.2 ML/MIN/1.73
ERYTHROCYTE [DISTWIDTH] IN BLOOD BY AUTOMATED COUNT: 13 % (ref 12.3–15.4)
GLUCOSE SERPL-MCNC: 128 MG/DL (ref 65–99)
HCT VFR BLD AUTO: 37.3 % (ref 34–46.6)
HGB BLD-MCNC: 11.8 G/DL (ref 12–15.9)
MCH RBC QN AUTO: 28.1 PG (ref 26.6–33)
MCHC RBC AUTO-ENTMCNC: 31.6 G/DL (ref 31.5–35.7)
MCV RBC AUTO: 88.8 FL (ref 79–97)
PLATELET # BLD AUTO: 281 10*3/MM3 (ref 140–450)
PMV BLD AUTO: 10.1 FL (ref 6–12)
POTASSIUM SERPL-SCNC: 4.1 MMOL/L (ref 3.5–5.2)
RBC # BLD AUTO: 4.2 10*6/MM3 (ref 3.77–5.28)
SODIUM SERPL-SCNC: 139 MMOL/L (ref 136–145)
WBC NRBC COR # BLD AUTO: 8.92 10*3/MM3 (ref 3.4–10.8)

## 2024-01-31 PROCEDURE — 85027 COMPLETE CBC AUTOMATED: CPT | Performed by: ORTHOPAEDIC SURGERY

## 2024-01-31 PROCEDURE — 97110 THERAPEUTIC EXERCISES: CPT

## 2024-01-31 PROCEDURE — 97116 GAIT TRAINING THERAPY: CPT

## 2024-01-31 PROCEDURE — 25010000002 CEFAZOLIN PER 500 MG: Performed by: ORTHOPAEDIC SURGERY

## 2024-01-31 PROCEDURE — 97166 OT EVAL MOD COMPLEX 45 MIN: CPT

## 2024-01-31 PROCEDURE — 80048 BASIC METABOLIC PNL TOTAL CA: CPT | Performed by: INTERNAL MEDICINE

## 2024-01-31 RX ORDER — ONDANSETRON 4 MG/1
4 TABLET, FILM COATED ORAL EVERY 8 HOURS PRN
Qty: 15 TABLET | Refills: 0 | Status: SHIPPED | OUTPATIENT
Start: 2024-01-31

## 2024-01-31 RX ORDER — CETIRIZINE HYDROCHLORIDE 10 MG/1
10 TABLET ORAL DAILY PRN
Status: DISCONTINUED | OUTPATIENT
Start: 2024-01-31 | End: 2024-01-31 | Stop reason: HOSPADM

## 2024-01-31 RX ORDER — ROPIVACAINE HYDROCHLORIDE 2 MG/ML
1 INJECTION, SOLUTION EPIDURAL; INFILTRATION; PERINEURAL CONTINUOUS
Start: 2024-01-31

## 2024-01-31 RX ORDER — ASPIRIN 81 MG/1
81 TABLET ORAL DAILY
Qty: 30 TABLET | Refills: 0 | Status: SHIPPED | OUTPATIENT
Start: 2024-01-31 | End: 2024-03-01

## 2024-01-31 RX ORDER — DOCUSATE SODIUM 100 MG/1
100 CAPSULE, LIQUID FILLED ORAL 2 TIMES DAILY
Qty: 30 CAPSULE | Refills: 0 | Status: SHIPPED | OUTPATIENT
Start: 2024-01-31 | End: 2024-02-15

## 2024-01-31 RX ORDER — ASPIRIN 81 MG/1
81 TABLET ORAL DAILY
Qty: 30 TABLET | Refills: 0 | Status: SHIPPED | OUTPATIENT
Start: 2024-01-31

## 2024-01-31 RX ORDER — OXYCODONE HYDROCHLORIDE 5 MG/1
5 TABLET ORAL EVERY 4 HOURS PRN
Qty: 40 TABLET | Refills: 0 | Status: SHIPPED | OUTPATIENT
Start: 2024-01-31

## 2024-01-31 RX ORDER — ACETAMINOPHEN 500 MG
500 TABLET ORAL EVERY 6 HOURS PRN
Qty: 30 TABLET | Refills: 0 | Status: SHIPPED | OUTPATIENT
Start: 2024-01-31

## 2024-01-31 RX ORDER — CETIRIZINE HYDROCHLORIDE 10 MG/1
10 TABLET ORAL ONCE
Status: COMPLETED | OUTPATIENT
Start: 2024-01-31 | End: 2024-01-31

## 2024-01-31 RX ADMIN — SODIUM CHLORIDE 2 G: 900 INJECTION INTRAVENOUS at 04:44

## 2024-01-31 RX ADMIN — SERTRALINE HYDROCHLORIDE 150 MG: 100 TABLET ORAL at 09:37

## 2024-01-31 RX ADMIN — ASPIRIN 81 MG: 81 TABLET, COATED ORAL at 09:38

## 2024-01-31 RX ADMIN — CETIRIZINE HYDROCHLORIDE 10 MG: 10 TABLET, FILM COATED ORAL at 09:38

## 2024-01-31 RX ADMIN — FAMOTIDINE 40 MG: 20 TABLET, FILM COATED ORAL at 09:38

## 2024-01-31 RX ADMIN — OXYBUTYNIN CHLORIDE 10 MG: 10 TABLET, EXTENDED RELEASE ORAL at 09:38

## 2024-01-31 RX ADMIN — PANTOPRAZOLE SODIUM 40 MG: 40 TABLET, DELAYED RELEASE ORAL at 05:57

## 2024-01-31 NOTE — CASE MANAGEMENT/SOCIAL WORK
Continued Stay Note  [facility]     Patient Name: Liya Sales  MRN: [id number]  Today's Date: 1/31/2024    Admit Date: 1/30/2024    Plan: Rolling walker   Discharge Plan       Row Name 01/31/24 1531       Plan    Plan Rolling walker    Patient/Family in Agreement with Plan yes    Plan Comments Patient requesting a rolling walker for home. [employer] provided rolling walker to bedside. Spouse to provide transportation to home.    Final Discharge Disposition Code 01 - home or self-care                   Discharge Codes    No documentation.                 Expected Discharge Date and Time       Expected Discharge Date Expected Discharge Time    Jan 31, 2024               Bethanie Kennedy RN

## 2024-01-31 NOTE — PLAN OF CARE
Problem: Adult Inpatient Plan of Care  Goal: Plan of Care Review  Recent Flowsheet Documentation  Taken 1/31/2024 1139 by Lili Uribe OT  Progress: improving  Plan of Care Reviewed With:   patient   spouse  Outcome Evaluation: OT Evaluation completed. Pt is A/Ox4 and motivated to work with therapy. Education reviewed for LLE precautions, ADL retraining to maintain, and transfer safety. BUE WFL for self-care. Mobility is limited by BUE fatigue. HEP initiated to support transfer training/AD use. Pt completed 3x5 reps chair push ups maintaining LLE NWB. Teaching completed for pt to advance HEP to include additional reps and sets. OT will d/c at this time. Pt has all necessary DME/AE at home. Recommend home with spouse and sons to assist as needed.

## 2024-01-31 NOTE — THERAPY TREATMENT NOTE
Patient Name: Liya Sales  : 1965    MRN: [id number]                              Today's Date: 2024       Admit Date: 2024    Visit Dx:     ICD-10-CM ICD-9-CM   1. Examination for normal comparison for clinical research  Z00.6 V70.7   2. Closed fracture of left ankle, initial encounter  S82.892A 824.8     Patient Active Problem List   Diagnosis    Depression    Anxiety    Allergies    Arthritis    Carpal tunnel syndrome on right    Overactive bladder    Other hyperlipidemia    Gastroesophageal reflux disease    Closed fracture of left ankle    Trimalleolar fracture of ankle, closed, left, initial encounter    Obesity    S/P ORIF (open reduction internal fixation) fracture, left ankle, 24    Ankle fracture     Past Medical History:   Diagnosis Date    Allergies     Anxiety     Arthritis     left ankle    Carpal tunnel syndrome on right     Depression     Fracture, fibula 2024    Knee swelling 10/22    Overactive bladder     Tear of meniscus of knee 10/22     Past Surgical History:   Procedure Laterality Date    BILATERAL BREAST REDUCTION      BREAST BIOPSY Left     in [place]     SECTION       and     INJECTION OF MEDICATION Left 2023    hylauronic acid in knee    LIPOMA EXCISION      under right arm    STEROID INJECTION Left 10/2023    knee    TRIGGER POINT INJECTION  10/22      General Information       Row Name 24 0941          Physical Therapy Time and Intention    Document Type therapy note (daily note)  -CM     Mode of Treatment physical therapy;individual therapy  -CM       Row Name 24 0941          General Information    Patient Profile Reviewed yes  -CM     Existing Precautions/Restrictions fall;non-weight bearing;other (see comments)  LLE NWB, popliteal nerve catheter  -CM     Barriers to Rehab none identified  -CM       Row Name 24 0941          Cognition    Orientation Status (Cognition) oriented x 3  -CM       Row  Name 01/31/24 0941          Safety Issues, Functional Mobility    Safety Issues Affecting Function (Mobility) awareness of need for assistance;insight into deficits/self-awareness;judgment;safety precaution awareness;safety precautions follow-through/compliance;sequencing abilities  -CM     Impairments Affecting Function (Mobility) balance;endurance/activity tolerance;pain;strength;sensation/sensory awareness;range of motion (ROM);motor control  -CM               User Key  (r) = Recorded By, (t) = Taken By, (c) = Cosigned By      Initials Name Provider Type    Thuy Ramires, PT Physical Therapist                   Mobility       Row Name 01/31/24 0942          Bed Mobility    Bed Mobility supine-sit  -CM     Supine-Sit [place] (Bed Mobility) standby assist  -CM       Row Name 01/31/24 0942          Sit-Stand Transfer    Sit-Stand [place] (Transfers) contact guard;nonverbal cues (demo/gesture);verbal cues  -CM     Assistive Device (Sit-Stand Transfers) walker, front-wheeled;walker, knee scooter  -CM     Comment, (Sit-Stand Transfer) from EOB with knee scooter and from chair with FWW, cues provided for safety with knee scooter including placement and brake use, cues also provided to push up from chair when using walker. Patient required increased time and cues for placement of LLE on knee scooter but did not require physical assist  -CM       Row Name 01/31/24 0942          Gait/Stairs (Locomotion)    [place] Level (Gait) contact guard;1 person assist;verbal cues  -CM     Assistive Device (Gait) walker, front-wheeled;walker, knee scooter  -CM     Distance in Feet (Gait) 400  -CM     Deviations/Abnormal Patterns (Gait) bilateral deviations;gait speed decreased;stride length decreased  -CM     Bilateral Gait Deviations forward flexed posture  -CM     Comment, (Gait/Stairs) Patient ambulated in bender on knee scooter with decreased speed, but demoing adequate balance. Cues provided for decreased  speed in turns and upright posture as she fatigues. Additional brief ambulation completed with FWW to replicate navigating tight spaces in home. Patient able to maintain NWB on LLE, however did have some difficulty clearing RLE to hop. She was able to take 3 small hops forward and 3 small hops backward with CGA. Stair training deferred as patient reports she is renting a ramp to use at home.  -CM       Row Name 01/31/24 0942          Mobility    Extremity Weight-bearing Status left lower extremity  -CM     Left Lower Extremity (Weight-bearing Status) non weight-bearing (NWB)   -CM               User Key  (r) = Recorded By, (t) = Taken By, (c) = Cosigned By      Initials Name Provider Type    Thuy Ramires PT Physical Therapist                   Obj/Interventions       Row Name 01/31/24 0929          Balance    Balance Assessment sitting static balance;standing static balance;standing dynamic balance  -CM     Static Sitting Balance standby assist  -CM     Position, Sitting Balance unsupported;sitting in chair;sitting edge of bed  -CM     Static Standing Balance contact guard  -CM     Dynamic Standing Balance contact guard  -CM     Position/Device Used, Standing Balance supported;walker, front-wheeled;other (see comments)  knee scooter  -CM     Comment, Balance unsteady at times in transitional movements, but no overt LOB  -CM               User Key  (r) = Recorded By, (t) = Taken By, (c) = Cosigned By      Initials Name Provider Type    Thuy Ramires PT Physical Therapist                   Goals/Plan    No documentation.                  Clinical Impression       Row Name 01/31/24 0970          Pain    Pretreatment Pain Rating 0/10 - no pain  -CM     Posttreatment Pain Rating 0/10 - no pain  -CM       Row Name 01/31/24 0948          Plan of Care Review    Plan of Care Reviewed With patient;spouse  -CM     Progress improving  -CM     Outcome Evaluation Patient showing improvement with ability to  navigate unit on a knee scooter and very brief ambulation with FWW both with CGA. Education provided regarding safety with nerve catheter and use of assistive devices. IPPT remains indicated to address current deficits resulting in functional mobility below baseline. Recommend D/C home with initial 24/7 assist and HHPT when medically appropriate. She will need a FWW prior to D/C.  -CM       Row Name 01/31/24 0947          Vital Signs    Pre Systolic BP Rehab 102  -CM     Pre Treatment Diastolic BP 58  -CM     Pretreatment Heart Rate (beats/min) 87  -CM     Posttreatment Heart Rate (beats/min) 93  -CM     Pre SpO2 (%) 97  -CM     O2 Delivery Pre Treatment room air  -CM     O2 Delivery Intra Treatment room air  -CM     Post SpO2 (%) 98  -CM     O2 Delivery Post Treatment room air  -CM     Pre Patient Position Supine  -CM     Intra Patient Position Standing  -CM     Post Patient Position Sitting  -CM       Row Name 01/31/24 0947          Positioning and Restraints    Pre-Treatment Position in bed  -CM     Post Treatment Position chair  -CM     In Chair reclined;call light within reach;encouraged to call for assist;exit alarm on;with family/caregiver;LLE elevated;notified nsg  -CM               User Key  (r) = Recorded By, (t) = Taken By, (c) = Cosigned By      Initials Name Provider Type    Thuy Ramires, PT Physical Therapist                   Outcome Measures       Row Name 01/31/24 0951          How much help from another person do you currently need...    Turning from your back to your side while in flat bed without using bedrails? 4  -CM     Moving from lying on back to sitting on the side of a flat bed without bedrails? 4  -CM     Moving to and from a bed to a chair (including a wheelchair)? 3  -CM     Standing up from a chair using your arms (e.g., wheelchair, bedside chair)? 3  -CM     Climbing 3-5 steps with a railing? 2  -CM     To walk in hospital room? 3  -CM     AM-PAC 6 Clicks Score (PT) 19  -CM      Highest Level of Mobility Goal 6 --> Walk 10 steps or more  -CM       Row Name 01/31/24 0951          Functional Assessment    Outcome Measure Options AM-PAC 6 Clicks Basic Mobility (PT)  -CM               User Key  (r) = Recorded By, (t) = Taken By, (c) = Cosigned By      Initials Name Provider Type    Thuy Ramires PT Physical Therapist                                 Physical Therapy Education       Title: PT OT SLP Therapies (In Progress)       Topic: Physical Therapy (In Progress)       Point: Mobility training (Done)       Learning Progress Summary             Patient Acceptance, E, VU by CM at 1/31/2024 0951    Comment: educated patient not to use her dual rollator/transport chair for ambulation purposes, okay to use to sit in and be pushed up ramp to enter home, but educated her to use knee scooter or walker for ambulation AAT    Acceptance, E,D, VU,NR by  at 1/30/2024 1815   Significant Other Acceptance, E, VU by CM at 1/31/2024 0951    Comment: educated patient not to use her dual rollator/transport chair for ambulation purposes, okay to use to sit in and be pushed up ramp to enter home, but educated her to use knee scooter or walker for ambulation AAT                         Point: Home exercise program (Not Started)       Learner Progress:  Not documented in this visit.              Point: Body mechanics (Done)       Learning Progress Summary             Patient Acceptance, E, VU by CM at 1/31/2024 0951    Comment: educated patient not to use her dual rollator/transport chair for ambulation purposes, okay to use to sit in and be pushed up ramp to enter home, but educated her to use knee scooter or walker for ambulation AAT    Acceptance, E,D, VU,NR by  at 1/30/2024 1815   Significant Other Acceptance, E, VU by CM at 1/31/2024 0951    Comment: educated patient not to use her dual rollator/transport chair for ambulation purposes, okay to use to sit in and be pushed up ramp to enter home,  but educated her to use knee scooter or walker for ambulation AAT                         Point: Precautions (Done)       Learning Progress Summary             Patient Acceptance, E, VU by CM at 1/31/2024 0951    Comment: educated patient not to use her dual rollator/transport chair for ambulation purposes, okay to use to sit in and be pushed up ramp to enter home, but educated her to use knee scooter or walker for ambulation AAT    Acceptance, E,D, VU,NR by  at 1/30/2024 1815   Significant Other Acceptance, E, VU by CM at 1/31/2024 0951    Comment: educated patient not to use her dual rollator/transport chair for ambulation purposes, okay to use to sit in and be pushed up ramp to enter home, but educated her to use knee scooter or walker for ambulation AAT                                         User Key       Initials Effective Dates Name Provider Type Discipline     12/15/23 -  Hina Mahoney, PT Physical Therapist PT     09/22/22 -  Thuy Marti, DURGA Physical Therapist PT                  PT Recommendation and Plan     Plan of Care Reviewed With: patient, spouse  Progress: improving  Outcome Evaluation: Patient showing improvement with ability to navigate unit on a knee scooter and very brief ambulation with FWW both with CGA. Education provided regarding safety with nerve catheter and use of assistive devices. IPPT remains indicated to address current deficits resulting in functional mobility below baseline. Recommend D/C home with initial 24/7 assist and HHPT when medically appropriate. She will need a FWW prior to D/C.     Time Calculation:         PT Charges       Row Name 01/31/24 0952             Time Calculation    Start Time 0853  -CM      PT Received On 01/31/24  -CM      PT Goal Re-Cert Due Date 02/09/24  -CM         Timed Charges    68893 - Gait Training Minutes  29  -CM         Total Minutes    Timed Charges Total Minutes 29  -CM       Total Minutes 29  -CM                User Key  (r) =  Recorded By, (t) = Taken By, (c) = Cosigned By      Initials Name Provider Type    CM Thuy Marti, PT Physical Therapist                  Therapy Charges for Today       Code Description Service Date Service Provider Modifiers Qty    69087529540 HC GAIT TRAINING EA 15 MIN 1/31/2024 Thuy Marti, PT GP 2            PT G-Codes  Outcome Measure Options: AM-PAC 6 Clicks Basic Mobility (PT)  AM-PAC 6 Clicks Score (PT): 19  PT Discharge Summary  Anticipated Discharge Disposition (PT): home with home health, home with 24/7 care    Thuy Marti, PT  1/31/2024

## 2024-01-31 NOTE — PLAN OF CARE
Goal Outcome Evaluation:  Plan of Care Reviewed With: patient, spouse        Progress: improving  Outcome Evaluation: Patient showing improvement with ability to navigate unit on a knee scooter and very brief ambulation with FWW both with CGA. Education provided regarding safety with nerve catheter and use of assistive devices. IPPT remains indicated to address current deficits resulting in functional mobility below baseline. Recommend D/C home with initial 24/7 assist and HHPT when medically appropriate. She will need a FWW prior to D/C.      Anticipated Discharge Disposition (PT): home with home health, home with 24/7 care

## 2024-01-31 NOTE — DISCHARGE INSTR - DIET
RX pending if okay.  ----- Message from Jennifer Gonzalez sent at 1/21/2024  1:13 PM EST -----  Regarding: Refill  Contact: [phone]  Is it possible to get my Vicoprofen refill a couple days early ? I had to take more over the last month because I was passing a kidney stone. It apparently has passed now but I only have a couple day’s supply left. And If so would you let Robert Applebaum MD know it’s okay to fill early too?   Thank you    Regular Consistent Carb diet

## 2024-01-31 NOTE — DISCHARGE SUMMARY
Patient Name: Liya Sales  MRN: [id number]  : 1965  DOS: 2024    Attending: Jasvir Tucker MD    Primary Care Provider: Vaishnavi Verma APRN    Date of Admission:.2024  8:49 AM    Date of Discharge:  2024    Discharge Diagnosis:   S/P ORIF (open reduction internal fixation) fracture, left ankle, 24    Depression    Anxiety    Overactive bladder    Other hyperlipidemia    Closed fracture of left ankle    Obesity    Ankle fracture      Hospital Course    At admit:    Patient is a pleasant 58 y.o. female presented for scheduled surgery by Dr. Tucker.     She suffered mechanical fall on ice on .  Subsequently seen and told she had a fracture.  Was placed in a three-way splint and made nonweightbearing.      Patient has since followed with Dr. Tucker, has complained of persistent pain at the fracture site, no other injuries.       Today she underwent ORIF of left ankle fracture.  Surgery was done under general anesthesia and a block, was tolerated well.     She has been admitted for further management.     I saw her in PACU as she is waking up from sedation.  Not in distress      After admit:    Patient was provided pain medications as needed for pain control, along with popliteal nerve block infusion of Ropivacaine.    Adjustments were made to pain medications to optimize postop pain management.   Risks and benefits of opiate medications discussed with patient.  [facility] report in chart was reviewed prior to discharge.    She was seen by PT has progressed well over her stay.  She used an IS for atelectasis prophylaxis and aspirin along with mechanicals for DVT prophylaxis.  Home medications were resumed as appropriate, and labs were monitored and remained fairly stable.     With the progress she has made, she is ready for DC home today.    Keep splint clean and dry until follow up appointment with Dr. Tucker.  She will have a popliteal nerve block (instructed on it during  "this admit)  Discussed with patient regarding plan and she shows understanding and agreement.     Procedures Performed    Date of Surgery:  2024     Pre-op Diagnosis:   Closed fracture of left ankle, initial encounter [S82.892A]     Post-op Diagnosis:      Post-Op Diagnosis Codes:     * Closed fracture of left ankle, initial encounter [S82.892A]     Procedure/CPT® Codes:  1. ORIF lateral maleolus, 82521   2. ORIF Syndesmosis, 65248  3. Stress fluoroscopy ankle 08875  4. Application short leg splint, 97749      Staff:  Surgeon(s):  Jasvir Tucker MD  Assistant: Caryn Schroeder PA-C    Pertinent Test Results:    I reviewed the patient's new clinical results.   Results from last 7 days   Lab Units 24  0541 24  1044   WBC 10*3/mm3 8.92 5.82   HEMOGLOBIN g/dL 11.8* 13.5   HEMATOCRIT % 37.3 43.7   PLATELETS 10*3/mm3 281 306     Results from last 7 days   Lab Units 24  0541 24  1044   SODIUM mmol/L 139 138   POTASSIUM mmol/L 4.1 4.2   CHLORIDE mmol/L 102 102   CO2 mmol/L 26.0 26.0   BUN mg/dL 13 14   CREATININE mg/dL 0.84 0.77   CALCIUM mg/dL 9.0 9.5   GLUCOSE mg/dL 128* 95       I reviewed the patient's new imaging including images and reports.      Physical therapy: Patient showing improvement with ability to navigate unit on a knee scooter and very brief ambulation with FWW both with CGA. Education provided regarding safety with nerve catheter and use of assistive devices. IPPT remains indicated to address current deficits resulting in functional mobility below baseline. Recommend D/C home with initial / assist and HHPT when medically appropriate. She will need a FWW prior to D/C.     Discharge Assessment:    Vital Signs  Visit Vitals  /58 (BP Location: Left arm, Patient Position: Lying)   Pulse 79   Temp 98.4 °F (36.9 °C) (Oral)   Resp 17   Ht 162.6 cm (64\")   Wt 104 kg (230 lb)   SpO2 97%   BMI 39.48 kg/m²     Temp (24hrs), Av.1 °F (36.7 °C), Min:98 °F (36.7 °C), Max:98.4 " °F (36.9 °C)      General Appearance:    Alert, cooperative, in no acute distress   Lungs:     Clear to auscultation,respirations regular, even and unlabored    Heart:    Regular rhythm and normal rate, normal S1 and S2   Abdomen:     Normal bowel sounds, no masses, no organomegaly, soft non-tender, non-distended, no guarding, no rebound tenderness   Extremities:   LLE splint CDI. Nerve block present. Good cap refill and movement left toes.   Pulses:   Pulses palpable and equal bilaterally   Skin:   No bleeding, bruising or rash   Neurologic:   Cranial nerves 2 - 12 grossly intact, sensation intact       Discharge Disposition: Home    Discharge Medications     Discharge Medications        New Medications        Instructions Start Date   acetaminophen 500 MG tablet  Commonly known as: TYLENOL   500 mg, Oral, Every 6 Hours PRN      docusate sodium 100 MG capsule  Commonly known as: Colace   100 mg, Oral, 2 Times Daily      ondansetron 4 MG tablet  Commonly known as: Zofran   4 mg, Oral, Every 8 Hours PRN      oxyCODONE 5 MG immediate release tablet  Commonly known as: Roxicodone   5 mg, Oral, Every 4 Hours PRN      ropivacaine 0.2 % infusion (INFUSYSTEM)  Commonly known as: NAROPIN   1 mL/hr (2 mg/hr), Peripheral Nerve, Continuous             Changes to Medications        Instructions Start Date   aspirin 81 MG EC tablet  Commonly known as: ASPIR  What changed: You were already taking a medication with the same name, and this prescription was added. Make sure you understand how and when to take each.   81 mg, Oral, Daily      aspirin 81 MG EC tablet  What changed: Another medication with the same name was added. Make sure you understand how and when to take each.   81 mg, Oral, Daily             Continue These Medications        Instructions Start Date   ammonium lactate 12 % lotion  Commonly known as: AmLactin   Topical, As Needed      atorvastatin 40 MG tablet  Commonly known as: LIPITOR   40 mg, Oral, Nightly       celecoxib 100 MG capsule  Commonly known as: CeleBREX   100 mg, Oral, 2 Times Daily PRN      famotidine 40 MG tablet  Commonly known as: Pepcid   40 mg, Oral, Daily      hydrOXYzine 50 MG tablet  Commonly known as: ATARAX   Take 1 tablet by mouth every night at bedtime as needed for sleep/anxiety.      lisdexamfetamine 50 MG capsule  Commonly known as: VYVANSE   50 mg, Oral, Every Morning      Mirabegron ER 50 MG tablet sustained-release 24 hour 24 hr tablet  Commonly known as: Myrbetriq   50 mg, Oral, Daily      multivitamin with minerals tablet tablet   1 tablet, Oral, Daily      omeprazole 40 MG capsule  Commonly known as: priLOSEC   40 mg, Oral, Daily      QC TUMERIC COMPLEX PO   1 capsule, Oral, Daily      sertraline 100 MG tablet  Commonly known as: ZOLOFT   150 mg, Oral, Daily      triamcinolone 0.1 % ointment  Commonly known as: KENALOG   1 application , Topical, 2 Times Daily      Wegovy 0.25 MG/0.5ML solution auto-injector  Generic drug: Semaglutide-Weight Management   0.25 mg, Subcutaneous, Weekly      Xyzal Allergy 24HR 5 MG tablet  Generic drug: levocetirizine   Take 1 tablet by mouth Every Evening.             Stop These Medications      oxyCODONE-acetaminophen 5-325 MG per tablet  Commonly known as: PERCOCET              Discharge Diet: Regular diet    Activity at Discharge: HEATHER LARAE    Follow-up Appointments  Dr. Tucker per his orders      LAURA Romano  01/31/24  11:52 EST

## 2024-01-31 NOTE — THERAPY DISCHARGE NOTE
Acute Care - Occupational Therapy Discharge  [facility]    Patient Name: Liya Sales  : 1965    MRN: [id number]                              Today's Date: 2024       Admit Date: 2024    Visit Dx:     ICD-10-CM ICD-9-CM   1. Examination for normal comparison for clinical research  Z00.6 V70.7   2. Closed fracture of left ankle, initial encounter  S82.892A 824.8     Patient Active Problem List   Diagnosis    Depression    Anxiety    Allergies    Arthritis    Carpal tunnel syndrome on right    Overactive bladder    Other hyperlipidemia    Gastroesophageal reflux disease    Closed fracture of left ankle    Trimalleolar fracture of ankle, closed, left, initial encounter    Obesity    S/P ORIF (open reduction internal fixation) fracture, left ankle, 24    Ankle fracture     Past Medical History:   Diagnosis Date    Allergies     Anxiety     Arthritis     left ankle    Carpal tunnel syndrome on right     Depression     Fracture, fibula 2024    Knee swelling 10/22    Overactive bladder     Tear of meniscus of knee 10/22     Past Surgical History:   Procedure Laterality Date    BILATERAL BREAST REDUCTION  2005    BREAST BIOPSY Left     in [place]     SECTION       and     INJECTION OF MEDICATION Left 2023    hylauronic acid in knee    LIPOMA EXCISION      under right arm    STEROID INJECTION Left 10/2023    knee    TRIGGER POINT INJECTION  10/22      General Information       Row Name 24 1126          OT Time and Intention    Document Type discharge evaluation/summary;discharge treatment  -TB     Mode of Treatment occupational therapy;individual therapy  -TB       Row Name 24 1126          General Information    Patient Profile Reviewed yes  -TB     Prior Level of Function independent:;all household mobility;community mobility;transfer;bed mobility;ADL's;home management;driving;shopping;using stairs  Pt was independent prior to slip and fall  1/19/2024  -     Existing Precautions/Restrictions fall;left;non-weight bearing;other (see comments)  s/p L ankle ORIF in cast with NWB precautions, popliteal nerve catheter  -TB     Barriers to Rehab physical barrier  -       Row Name 01/31/24 1126          Occupational Profile    Reason for Services/Referral (Occupational Profile) Occupational decline  -TB     Environmental Supports and Barriers (Occupational Profile) Pt lives with her spouse and has adult sons to assist.  Ramp to enter. Walk-in shower with borrowed seat. BSC for bedroom. Commode rails in place in bathroom. Rollator, knee scooter, crutches available  -       Row Name 01/31/24 1126          Living Environment    People in Home child(john), adult;spouse  -TB       Row Name 01/31/24 1126          Home Main Entrance    Number of Stairs, Main Entrance none;other (see comments)  Ramp in place  -       Row Name 01/31/24 1126          Stairs Within Home, Primary    Number of Stairs, Within Home, Primary none  -       Row Name 01/31/24 1126          Cognition    Orientation Status (Cognition) oriented x 4  -TB       Row Name 01/31/24 1126          Safety Issues, Functional Mobility    Safety Issues Affecting Function (Mobility) sequencing abilities  -TB     Impairments Affecting Function (Mobility) balance;endurance/activity tolerance;pain;strength;motor control;range of motion (ROM)  -     Comment, Safety Issues/Impairments (Mobility) Pt up with assist x1 using AD. Able to maintain LLE NWB precautions  -               User Key  (r) = Recorded By, (t) = Taken By, (c) = Cosigned By      Initials Name Provider Type     Lili Uribe OT Occupational Therapist                   Mobility/ADL's       Row Name 01/31/24 1132          Bed Mobility    Comment, (Bed Mobility) UIC  -       Row Name 01/31/24 1132          Transfers    Transfers sit-stand transfer;stand-sit transfer  -     Comment, (Transfers) Education reviewed for hand  placement and safety. Pt maintain LLE NWB precautions.  -TB       Row Name 01/31/24 1132          Sit-Stand Transfer    Sit-Stand [place] (Transfers) contact guard  -TB       Row Name 01/31/24 1132          Stand-Sit Transfer    Stand-Sit [place] (Transfers) contact guard  -TB       Row Name 01/31/24 1132          Activities of Daily Living    BADL Assessment/Intervention lower body dressing;bathing  -TB       Row Name 01/31/24 1132          Mobility    Extremity Weight-bearing Status left lower extremity  -TB     Left Lower Extremity (Weight-bearing Status) non weight-bearing (NWB)   -TB       Row Name 01/31/24 1132          Lower Body Dressing Assessment/Training    [place] Level (Lower Body Dressing) lower body dressing skills;moderate assist (50% patient effort)  -TB     Comment, (Lower Body Dressing) Education reviewed for precautions and ADL retraining to maintain. Pt has done a good job problem solving self-care obstacles at home between fall and surgery. Appreciative of education.  -TB       Row Name 01/31/24 1132          Bathing Assessment/Intervention    [place] Level (Bathing) bathing skills;minimum assist (75% patient effort)  -TB     Comment, (Bathing) Education reviewed for precautions and ADL retraining to maintain. Pt has done a good job problem solving self-care obstacles at home between fall and surgery. Appreciative of education and acknowledges that she cannot shower until nerve catheter is removed.  -TB               User Key  (r) = Recorded By, (t) = Taken By, (c) = Cosigned By      Initials Name Provider Type    TB Lili Uribe, OT Occupational Therapist                   Obj/Interventions       Row Name 01/31/24 1136          Sensory Assessment (Somatosensory)    Sensory Assessment (Somatosensory) UE sensation intact  -TB       Row Name 01/31/24 1136          Vision Assessment/Intervention    Visual Impairment/Limitations corrective lenses full-time  -TB        Row Name 01/31/24 1136          Range of Motion Comprehensive    General Range of Motion bilateral upper extremity ROM WFL  -TB     Comment, General Range of Motion BUE AROM intact  -TB       Row Name 01/31/24 1136          Strength Comprehensive (MMT)    Comment, General Manual Muscle Testing (MMT) Assessment Generalized weakness/deconditioned. BUE WFL for self-care. Mobility is limited by fatigue. HEP initiated to support transfer training/AD use.  -       Row Name 01/31/24 1136          Elbow/Forearm (Therapeutic Exercise)    Elbow/Forearm (Therapeutic Exercise) strengthening exercise  -TB     Elbow/Forearm Strengthening (Therapeutic Exercise) bilateral;flexion;extension;5 repetitions;3 sets  -TB       Row Name 01/31/24 1136          Motor Skills    Therapeutic Exercise elbow/forearm  Pt completed 3x5 reps chair pushups to support transfer training and LB ADL performance. Good effort. Teaching completed for pt to grade HEP for increased sets and reps daily  -TB       Row Name 01/31/24 1136          Balance    Balance Assessment sitting static balance;sitting dynamic balance;sit to stand dynamic balance;standing static balance  -TB     Static Sitting Balance independent  -TB     Dynamic Sitting Balance independent  -TB     Position, Sitting Balance unsupported;sitting in chair  -TB     Sit to Stand Dynamic Balance contact guard  -TB     Static Standing Balance contact guard  -TB     Balance Interventions sitting;standing;sit to stand;supported;occupation based/functional task;UE activity with balance activity  -TB     Comment, Balance No LOB  -TB               User Key  (r) = Recorded By, (t) = Taken By, (c) = Cosigned By      Initials Name Provider Type    TB Lili Uribe OT Occupational Therapist                   Goals/Plan    No documentation.                  Clinical Impression       Row Name 01/31/24 1139          Pain Assessment    Pretreatment Pain Rating 0/10 - no pain  -TB     Posttreatment  Pain Rating 0/10 - no pain  -TB     Pre/Posttreatment Pain Comment Pt denies pain with dynamic activity  -TB     Pain Intervention(s) Ambulation/increased activity;Repositioned;Elevated;Other (Comment)  LLE popliteal nerve catheter infusing  -TB       Row Name 01/31/24 1139          Plan of Care Review    Plan of Care Reviewed With patient;spouse  -TB     Progress improving  -TB     Outcome Evaluation OT Evaluation completed. Pt is A/Ox4 and motivated to work with therapy. Education reviewed for LLE precautions, ADL retraining to maintain, and transfer safety. BUE WFL for self-care. Mobility is limited by BUE fatigue. HEP initiated to support transfer training/AD use. Pt completed 3x5 reps chair push ups maintaining LLE NWB. Teaching completed for pt to advance HEP to include additional reps and sets. OT will d/c at this time. Pt has all necessary DME/AE at home. Recommend home with spouse and sons to assist as needed.  -TB       Row Name 01/31/24 1139          Therapy Assessment/Plan (OT)    Therapy Frequency (OT) evaluation only  -TB       Row Name 01/31/24 1139          Therapy Plan Review/Discharge Plan (OT)    Anticipated Discharge Disposition (OT) home with assist  -TB       Row Name 01/31/24 1139          Vital Signs    Pre Systolic BP Rehab --  RN cleared OT  -TB     Pre SpO2 (%) 98  -TB     O2 Delivery Pre Treatment room air  -TB     Pre Patient Position Sitting  -TB     Post Patient Position Sitting  -TB       Row Name 01/31/24 1139          Positioning and Restraints    Pre-Treatment Position sitting in chair/recliner  -TB     Post Treatment Position chair  -TB     In Chair notified nsg;reclined;call light within reach;encouraged to call for assist;exit alarm on;waffle cushion;legs elevated;LLE elevated  -TB               User Key  (r) = Recorded By, (t) = Taken By, (c) = Cosigned By      Initials Name Provider Type    Lili Doss, OT Occupational Therapist                   Outcome Measures        Row Name 01/31/24 1142          How much help from another is currently needed...    Putting on and taking off regular lower body clothing? 2  -TB     Bathing (including washing, rinsing, and drying) 2  -TB     Toileting (which includes using toilet bed pan or urinal) 3  -TB     Putting on and taking off regular upper body clothing 4  -TB     Taking care of personal grooming (such as brushing teeth) 4  -TB     Eating meals 4  -TB     AM-PAC 6 Clicks Score (OT) 19  -TB       Row Name 01/31/24 0951          How much help from another person do you currently need...    Turning from your back to your side while in flat bed without using bedrails? 4  -CM     Moving from lying on back to sitting on the side of a flat bed without bedrails? 4  -CM     Moving to and from a bed to a chair (including a wheelchair)? 3  -CM     Standing up from a chair using your arms (e.g., wheelchair, bedside chair)? 3  -CM     Climbing 3-5 steps with a railing? 2  -CM     To walk in hospital room? 3  -CM     AM-PAC 6 Clicks Score (PT) 19  -CM     Highest Level of Mobility Goal 6 --> Walk 10 steps or more  -CM       Row Name 01/31/24 1142 01/31/24 0951       Functional Assessment    Outcome Measure Options AM-PAC 6 Clicks Daily Activity (OT)  -TB AM-PAC 6 Clicks Basic Mobility (PT)  -CM              User Key  (r) = Recorded By, (t) = Taken By, (c) = Cosigned By      Initials Name Provider Type    Lili Doss OT Occupational Therapist    CM Thuy Marti, PT Physical Therapist                  Occupational Therapy Education       Title: PT OT SLP Therapies (In Progress)       Topic: Occupational Therapy (In Progress)       Point: ADL training (Done)       Description:   Instruct learner(s) on proper safety adaptation and remediation techniques during self care or transfers.   Instruct in proper use of assistive devices.                  Learning Progress Summary             Patient Acceptance, E,D,TB, VU,DU by TB at  1/31/2024 1143   Family Acceptance, E,D,TB, VU,DU by TB at 1/31/2024 1143                         Point: Home exercise program (Done)       Description:   Instruct learner(s) on appropriate technique for monitoring, assisting and/or progressing therapeutic exercises/activities.                  Learning Progress Summary             Patient Acceptance, E,D,TB, VU,DU by TB at 1/31/2024 1143   Family Acceptance, E,D,TB, VU,DU by TB at 1/31/2024 1143                         Point: Precautions (Done)       Description:   Instruct learner(s) on prescribed precautions during self-care and functional transfers.                  Learning Progress Summary             Patient Acceptance, E,D,TB, VU,DU by TB at 1/31/2024 1143   Family Acceptance, E,D,TB, VU,DU by  at 1/31/2024 1143                         Point: Body mechanics (Not Started)       Description:   Instruct learner(s) on proper positioning and spine alignment during self-care, functional mobility activities and/or exercises.                  Learner Progress:  Not documented in this visit.                              User Key       Initials Effective Dates Name Provider Type Discipline     07/11/23 -  Lili Uribe, OT Occupational Therapist OT                  OT Recommendation and Plan  Therapy Frequency (OT): evaluation only  Plan of Care Review  Plan of Care Reviewed With: patient, spouse  Progress: improving  Outcome Evaluation: OT Evaluation completed. Pt is A/Ox4 and motivated to work with therapy. Education reviewed for LLE precautions, ADL retraining to maintain, and transfer safety. BUE WFL for self-care. Mobility is limited by BUE fatigue. HEP initiated to support transfer training/AD use. Pt completed 3x5 reps chair push ups maintaining LLE NWB. Teaching completed for pt to advance HEP to include additional reps and sets. OT will d/c at this time. Pt has all necessary DME/AE at home. Recommend home with spouse and sons to assist as  needed.  Plan of Care Reviewed With: patient, spouse  Outcome Evaluation: OT Evaluation completed. Pt is A/Ox4 and motivated to work with therapy. Education reviewed for LLE precautions, ADL retraining to maintain, and transfer safety. BUE WFL for self-care. Mobility is limited by BUE fatigue. HEP initiated to support transfer training/AD use. Pt completed 3x5 reps chair push ups maintaining LLE NWB. Teaching completed for pt to advance HEP to include additional reps and sets. OT will d/c at this time. Pt has all necessary DME/AE at home. Recommend home with spouse and sons to assist as needed.     Time Calculation:   Evaluation Complexity (OT)  Review Occupational Profile/Medical/Therapy History Complexity: expanded/moderate complexity  Assessment, Occupational Performance/Identification of Deficit Complexity: 3-5 performance deficits  Clinical Decision Making Complexity (OT): detailed assessment/moderate complexity  Overall Complexity of Evaluation (OT): moderate complexity     Time Calculation- OT       Row Name 01/31/24 1049 01/31/24 0952          Time Calculation- OT    OT Start Time 1049  -TB --     OT Received On 01/31/24  -TB --        Timed Charges    56822 - OT Therapeutic Exercise Minutes 15  -TB --     24947 - Gait Training Minutes  -- 29  -CM        Untimed Charges    OT Eval/Re-eval Minutes 45  -TB --        Total Minutes    Timed Charges Total Minutes 15  -TB 29  -CM     Untimed Charges Total Minutes 45  -TB --      Total Minutes 60  -TB 29  -CM               User Key  (r) = Recorded By, (t) = Taken By, (c) = Cosigned By      Initials Name Provider Type    TB Lili Uribe OT Occupational Therapist    CM Thuy Marti, DURGA Physical Therapist                  Therapy Charges for Today       Code Description Service Date Service Provider Modifiers Qty    [id number]  OT THER PROC EA 15 MIN 1/31/2024 Lili Uribe OT GO 1    [id number] HC OT EVAL MOD COMPLEXITY 3 1/31/2024  Rony, Lili Quiles, OT GO 1               OT Discharge Summary  Anticipated Discharge Disposition (OT): home with assist    Lili Uribe, OT  1/31/2024

## 2024-01-31 NOTE — PROGRESS NOTES
[place]    Acute pain service Inpatient Progress Note    Patient Name: Liya Sales  :  1965  MRN:  [id number]        Acute Pain  Service Inpatient Progress Note:    Analgesia:Good  Pain Score:5/10  LOC: alert and awake  Resp Status: room air  Cardiac: VS stable  Side Effects:None  Catheter Site:clean, dressing intact and dry  Cath type: peripheral nerve cath with ON Q  Volume: 1mL,8ml, 8ml InfuSystem Pump.  Catheter Plan:Catheter to remain Insitu and Continue catheter infusion rate unchanged  Comments:

## 2024-01-31 NOTE — CASE MANAGEMENT/SOCIAL WORK
Discharge Planning Assessment  [facility]     Patient Name: Liya Sales  MRN: [id number]  Today's Date: 1/31/2024    Admit Date: 1/30/2024    Plan: Home with family assistance   Discharge Needs Assessment       Row Name 01/31/24 1105       Living Environment    People in Home child(john), adult;spouse    Name(s) of People in Home Duy Sales Spouse   [phone]    Current Living Arrangements home    Potentially Unsafe Housing Conditions none    In the past 12 months has the electric, gas, oil, or water company threatened to shut off services in your home? No    Primary Care Provided by self    Provides Primary Care For no one    Family Caregiver if Needed spouse;child(john), adult    Family Caregiver Names Duy Sales Spouse   [phone]    Quality of Family Relationships helpful;involved;supportive    Able to Return to Prior Arrangements yes       Resource/Environmental Concerns    Resource/Environmental Concerns none    Transportation Concerns none       Transportation Needs    In the past 12 months, has lack of transportation kept you from medical appointments or from getting medications? no    In the past 12 months, has lack of transportation kept you from meetings, work, or from getting things needed for daily living? No       Food Insecurity    Within the past 12 months, you worried that your food would run out before you got the money to buy more. Never true    Within the past 12 months, the food you bought just didn't last and you didn't have money to get more. Never true       Transition Planning    Patient/Family Anticipates Transition to home with family    Patient/Family Anticipated Services at Transition     Transportation Anticipated family or friend will provide       Discharge Needs Assessment    Readmission Within the Last 30 Days no previous admission in last 30 days    Equipment Currently Used at Home walker, rolling;ramp;other (see comments);rollator;commode;shower  chair;grab bar;bp cuff;scales  knee scooter    Concerns to be Addressed denies needs/concerns at this time    Anticipated Changes Related to Illness none    Equipment Needed After Discharge none                   Discharge Plan       Row Name 01/31/24 1107       Plan    Plan Home with family assistance    Patient/Family in Agreement with Plan yes    Plan Comments CM spoke with patient at bedside regarding DC planning. Patient resides in [facility] with her spouse and two adult sons. Patient is independent with ADL's, has all DME in her home to include a knee scooter, rolling walker, ramp, BSC. Patient denies any current home health or outpatient services. Patient has medical insurance, prescription coverage and is able to afford/obtain medications without difficulty. Patient has no advanced directives. Patient denies any discharge planning needs. Plan is home with family assistance. CM will continue to follow    Final Discharge Disposition Code 01 - home or self-care                  Continued Care and Services - Admitted Since 1/30/2024    Coordination has not been started for this encounter.       Expected Discharge Date and Time       Expected Discharge Date Expected Discharge Time    Feb 2, 2024            Demographic Summary       Row Name 01/31/24 1104       General Information    Arrived From home    Referral Source physician    Reason for Consult discharge planning    Preferred Language English       Contact Information    Contact Information Comments Duy Sales Spouse   [phone]                   Functional Status       Row Name 01/31/24 1104       Functional Status    Usual Activity Tolerance good    Current Activity Tolerance --  See PT notes       Physical Activity    On average, how many days per week do you engage in moderate to strenuous exercise (like a brisk walk)? 5 days    On average, how many minutes do you engage in exercise at this level? 30 min    Number of minutes of exercise per week  150       Assessment of Health Literacy    How often do you have someone help you read hospital materials? Never    How often do you have problems learning about your medical condition because of difficulty understanding written information? Never    How often do you have a problem understanding what is told to you about your medical condition? Never    How confident are you filling out medical forms by yourself? Quite a bit    Health Literacy Good       Functional Status, IADL    Medications independent    Meal Preparation independent    Housekeeping independent    Laundry independent    Shopping independent       Mental Status    General Appearance WDL WDL       Mental Status Summary    Recent Changes in Mental Status/Cognitive Functioning no changes       Employment/    Employment Status employed full-time                   Psychosocial    No documentation.                  Abuse/Neglect    No documentation.                  Legal    No documentation.                  Substance Abuse    No documentation.                  Patient Forms    No documentation.                     Bethanie Kennedy RN

## 2024-01-31 NOTE — PLAN OF CARE
Problem: Adult Inpatient Plan of Care  Goal: Absence of Hospital-Acquired Illness or Injury  Intervention: Prevent Skin Injury  Recent Flowsheet Documentation  Taken 1/30/2024 2019 by Yvonne Baeza RN  Body Position: position changed independently  Intervention: Prevent and Manage VTE (Venous Thromboembolism) Risk  Recent Flowsheet Documentation  Taken 1/30/2024 2019 by Yvonne Baeza RN  VTE Prevention/Management:   right   sequential compression devices on  Goal: Optimal Comfort and Wellbeing  Intervention: Provide Person-Centered Care  Recent Flowsheet Documentation  Taken 1/30/2024 2019 by Yvonne Baeza RN  Trust Relationship/Rapport:   care explained   questions encouraged   questions answered   reassurance provided   thoughts/feelings acknowledged     Problem: Pain Acute  Goal: Acceptable Pain Control and Functional Ability  Intervention: Optimize Psychosocial Wellbeing  Recent Flowsheet Documentation  Taken 1/30/2024 2019 by Yvonne Baeza RN  Diversional Activities:   television   smartphone     Problem: Embolism (Orthopaedic Fracture)  Goal: Absence of Embolism Signs and Symptoms  Intervention: Prevent or Manage Embolism Risk  Recent Flowsheet Documentation  Taken 1/30/2024 2019 by Yvonne Baeza RN  VTE Prevention/Management:   right   sequential compression devices on     Problem: Pain (Orthopaedic Fracture)  Goal: Acceptable Pain Control  Intervention: Manage Acute Orthopaedic-Related Pain  Recent Flowsheet Documentation  Taken 1/30/2024 2019 by Yvonne Baeza RN  Diversional Activities:   television   smartphone     Problem: Respiratory Compromise (Orthopaedic Fracture)  Goal: Effective Oxygenation and Ventilation  Intervention: Promote Airway Secretion Clearance  Recent Flowsheet Documentation  Taken 1/30/2024 2019 by Yvonne Baeza RN  Cough And Deep Breathing: done independently per patient   Goal Outcome Evaluation:            Patient alert and oriented X4,  "pleasant, compliant with treatment regimen. Ambulating as a X 1 with a walker and gait belt. Patient denies need for PO pain medications overnight. Bilateral nares irritated. Patient states,\"The alcohol swabs they did before surgery made my nose itch and run.\" IVF stopped per order recommendations, as patient is adequately able to consume PO fluids. No BM observed this shift. Yvonne Baeza RN                                     "

## 2024-02-04 DIAGNOSIS — K21.9 GASTROESOPHAGEAL REFLUX DISEASE, UNSPECIFIED WHETHER ESOPHAGITIS PRESENT: ICD-10-CM

## 2024-02-04 DIAGNOSIS — N32.81 OAB (OVERACTIVE BLADDER): ICD-10-CM

## 2024-02-05 RX ORDER — MIRABEGRON 50 MG/1
50 TABLET, FILM COATED, EXTENDED RELEASE ORAL DAILY
Qty: 90 TABLET | Refills: 1 | Status: SHIPPED | OUTPATIENT
Start: 2024-02-05

## 2024-02-05 RX ORDER — OMEPRAZOLE 40 MG/1
40 CAPSULE, DELAYED RELEASE ORAL DAILY
Qty: 90 CAPSULE | Refills: 1 | Status: SHIPPED | OUTPATIENT
Start: 2024-02-05

## 2024-02-05 RX ORDER — FAMOTIDINE 40 MG/1
40 TABLET, FILM COATED ORAL DAILY
Qty: 90 TABLET | Refills: 0 | Status: SHIPPED | OUTPATIENT
Start: 2024-02-05

## 2024-02-10 ENCOUNTER — PATIENT MESSAGE (OUTPATIENT)
Dept: FAMILY MEDICINE CLINIC | Facility: CLINIC | Age: 59
End: 2024-02-10
Payer: COMMERCIAL

## 2024-02-10 DIAGNOSIS — N32.81 OAB (OVERACTIVE BLADDER): Primary | ICD-10-CM

## 2024-02-10 DIAGNOSIS — E78.5 DYSLIPIDEMIA: ICD-10-CM

## 2024-02-12 ENCOUNTER — OFFICE VISIT (OUTPATIENT)
Dept: ORTHOPEDIC SURGERY | Facility: CLINIC | Age: 59
End: 2024-02-12
Payer: COMMERCIAL

## 2024-02-12 ENCOUNTER — TELEPHONE (OUTPATIENT)
Dept: ORTHOPEDIC SURGERY | Facility: CLINIC | Age: 59
End: 2024-02-12

## 2024-02-12 VITALS — DIASTOLIC BLOOD PRESSURE: 84 MMHG | SYSTOLIC BLOOD PRESSURE: 142 MMHG

## 2024-02-12 DIAGNOSIS — Z09 FRACTURE FOLLOW-UP: Primary | ICD-10-CM

## 2024-02-12 PROCEDURE — 99024 POSTOP FOLLOW-UP VISIT: CPT | Performed by: ORTHOPAEDIC SURGERY

## 2024-02-12 RX ORDER — ATORVASTATIN CALCIUM 40 MG/1
40 TABLET, FILM COATED ORAL
Qty: 90 TABLET | Refills: 1 | Status: SHIPPED | OUTPATIENT
Start: 2024-02-12

## 2024-02-12 NOTE — TELEPHONE ENCOUNTER
Hub staff attempted to follow warm transfer process and was unsuccessful     Caller: Liya Sales    Relationship to patient: Self    Best call back number: [phone]    Patient is needing: PATIENT STATES HER EMPLOYER STATED WE SAID SHE CAN'T DO ANYTHING AT WORK - PLEASE REACH OUT AND ADVISE

## 2024-02-13 RX ORDER — VIBEGRON 75 MG/1
1 TABLET, FILM COATED ORAL DAILY
Qty: 30 TABLET | Refills: 3 | Status: SHIPPED | OUTPATIENT
Start: 2024-02-13

## 2024-02-13 RX ORDER — VIBEGRON 75 MG/1
1 TABLET, FILM COATED ORAL DAILY
Qty: 30 TABLET | Refills: 3 | Status: SHIPPED | OUTPATIENT
Start: 2024-02-13 | End: 2024-02-13 | Stop reason: SDUPTHER

## 2024-02-14 ENCOUNTER — TELEPHONE (OUTPATIENT)
Dept: BEHAVIORAL HEALTH | Facility: CLINIC | Age: 59
End: 2024-02-14
Payer: COMMERCIAL

## 2024-02-14 ENCOUNTER — PATIENT MESSAGE (OUTPATIENT)
Dept: ORTHOPEDIC SURGERY | Facility: CLINIC | Age: 59
End: 2024-02-14
Payer: COMMERCIAL

## 2024-02-14 NOTE — TELEPHONE ENCOUNTER
PATIENT CALLED IN AND WANTS TO KNOW IF WORK NOTE CAN BE EDITED TO SAY THAT SHE CAN GO INTO WORK ONCE A TWICE AS NEEDED.   SHE ALSO HAS QUESTIONS ABOUT HOW HER DISABILITY PAPERWORK WAS FILLED OUT, BECAUSE THEY THINK SHE CANNOT WORK AT ALL.     IF DR. MEREDITH OR CLINICAL COULD PLEASE ADVISE.     I CAN SEND WORK NOTE TO HER CHART IF CHANGE IS APPROVED.     CALL BACK # [phone]

## 2024-02-16 ENCOUNTER — PRIOR AUTHORIZATION (OUTPATIENT)
Dept: FAMILY MEDICINE CLINIC | Facility: CLINIC | Age: 59
End: 2024-02-16
Payer: COMMERCIAL

## 2024-02-27 ENCOUNTER — TELEMEDICINE (OUTPATIENT)
Dept: BEHAVIORAL HEALTH | Facility: CLINIC | Age: 59
End: 2024-02-27
Payer: COMMERCIAL

## 2024-02-27 ENCOUNTER — TELEPHONE (OUTPATIENT)
Dept: BEHAVIORAL HEALTH | Facility: CLINIC | Age: 59
End: 2024-02-27

## 2024-02-27 VITALS — HEIGHT: 64 IN | WEIGHT: 230 LBS | BODY MASS INDEX: 39.27 KG/M2

## 2024-02-27 DIAGNOSIS — G47.20 CIRCADIAN RHYTHM SLEEP DISORDER, UNSPECIFIED TYPE: ICD-10-CM

## 2024-02-27 DIAGNOSIS — F41.1 GENERALIZED ANXIETY DISORDER: ICD-10-CM

## 2024-02-27 DIAGNOSIS — F90.0 ATTENTION DEFICIT HYPERACTIVITY DISORDER (ADHD), PREDOMINANTLY INATTENTIVE TYPE: Primary | ICD-10-CM

## 2024-02-27 RX ORDER — HYDROXYZINE 50 MG/1
TABLET, FILM COATED ORAL
Qty: 30 TABLET | Refills: 1 | Status: SHIPPED | OUTPATIENT
Start: 2024-02-27

## 2024-02-27 RX ORDER — MIRABEGRON 50 MG/1
1 TABLET, FILM COATED, EXTENDED RELEASE ORAL DAILY
COMMUNITY
Start: 2024-02-17

## 2024-02-27 RX ORDER — LISDEXAMFETAMINE DIMESYLATE CAPSULES 50 MG/1
50 CAPSULE ORAL EVERY MORNING
Qty: 30 CAPSULE | Refills: 0 | Status: SHIPPED | OUTPATIENT
Start: 2024-02-27

## 2024-02-27 NOTE — TELEPHONE ENCOUNTER
CALLED PATIENT TO CHECK IN FOR TELEHEALTH APPT WITH MONTRELL COLÓN, MAILBOX IS FULL, COULD NOT LEAVE MESSAGE

## 2024-02-27 NOTE — PROGRESS NOTES
Video Visit      Patient Name: Liya Sales  : 1965   MRN: [id number]     Referring Provider: Vaishnavi Verma APRN    Chief Complaint:      ICD-10-CM ICD-9-CM   1. Attention deficit hyperactivity disorder (ADHD), predominantly inattentive type  F90.0 314.00   2. Generalized anxiety disorder  F41.1 300.02   3. Circadian rhythm sleep disorder, unspecified type  G47.20 327.30        This provider is located at the [facility] (through [facility]), [address] [place] [address] using a secure tribrt Video Visit through [employer]. Patient is being seen remotely via telehealth video visit at their home address in [place], and stated they are in a secure environment for this session. The patient's condition being diagnosed/treated is appropriate for telemedicine. The provider identified herself as well as her credentials. The patient, and/or patients guardian, consent to be seen remotely, and when consent is given they understand that the consent allows for patient identifiable information to be sent to a third party as needed. They may refuse to be seen remotely at any time. The electronic data is encrypted and password protected, and the patient and/or guardian has been advised of the potential risks to privacy not withstanding such measures.    The patient has chosen to receive care today through a telehealth video visit. Do you consent to use a video/audio connection for your medical care today? Yes    History of Present Illness:   Liya Sales is a 58 y.o. female who is being seen by a video visit today for psychiatric medications.    Subjective     Finally found a job, wasn't what I wanted but it will work.  Fell and broke my leg when it was snowing outside.  Had to get surgery.  Wasn't great because I had just started that job.  I like to go into the office and have to work from home a lot with this job.  Have been off my adhd  medicine because of this injury and surgery.  Ready to get back to it, I need to focus on my reports and get things done with this new job.       Review of Systems:   Review of Systems   Psychiatric/Behavioral:  Positive for decreased concentration, sleep disturbance and stress.        RISK ASSESSMENT:  Patient denies any thoughts of suicide or intent today. Patient denies any suicidal or homicidal ideation today. Patient denies any high risk factors today.     Medications:     Current Outpatient Medications:     hydrOXYzine (ATARAX) 50 MG tablet, Take 1 tablet by mouth every night at bedtime as needed for sleep/anxiety., Disp: 30 tablet, Rfl: 1    lisdexamfetamine (VYVANSE) 50 MG capsule, Take 1 capsule by mouth Every Morning, Disp: 30 capsule, Rfl: 0    Myrbetriq 50 MG tablet sustained-release 24 hour 24 hr tablet, Take 50 mg by mouth Daily., Disp: , Rfl:     acetaminophen (TYLENOL) 500 MG tablet, Take 1 tablet by mouth Every 6 (Six) Hours As Needed for Mild Pain., Disp: 30 tablet, Rfl: 0    ammonium lactate (AmLactin) 12 % lotion, Apply  topically to the appropriate area as directed As Needed for Dry Skin., Disp: 225 g, Rfl: 1    aspirin (ASPIR) 81 MG EC tablet, Take 1 tablet by mouth Daily for 30 days., Disp: 30 tablet, Rfl: 0    aspirin 81 MG EC tablet, Take 1 tablet by mouth Daily., Disp: 30 tablet, Rfl: 0    atorvastatin (LIPITOR) 40 MG tablet, TAKE 1 TABLET BY MOUTH EVERY DAY AT NIGHT, Disp: 90 tablet, Rfl: 1    celecoxib (CeleBREX) 100 MG capsule, Take 1 capsule by mouth 2 (Two) Times a Day As Needed for Mild Pain or Moderate Pain., Disp: 60 capsule, Rfl: 0    famotidine (PEPCID) 40 MG tablet, TAKE 1 TABLET BY MOUTH EVERY DAY, Disp: 90 tablet, Rfl: 0    levocetirizine (Xyzal Allergy 24HR) 5 MG tablet, Take 1 tablet by mouth Every Evening., Disp: , Rfl:     multivitamin with minerals tablet tablet, Take 1 tablet by mouth Daily., Disp: , Rfl:     omeprazole (priLOSEC) 40 MG capsule, TAKE 1 CAPSULE BY MOUTH  "EVERY DAY, Disp: 90 capsule, Rfl: 1    ondansetron (Zofran) 4 MG tablet, Take 1 tablet by mouth Every 8 (Eight) Hours As Needed for Nausea or Vomiting for up to 15 doses., Disp: 15 tablet, Rfl: 0    oxyCODONE (Roxicodone) 5 MG immediate release tablet, Take 1 tablet by mouth Every 4 (Four) Hours As Needed (pain)., Disp: 40 tablet, Rfl: 0    ropivacaine (NAROPIN) 0.2 % infusion (INFUSYSTEM), 2 mg/hr by Peripheral Nerve route Continuous., Disp: , Rfl:     Semaglutide-Weight Management (Wegovy) 0.25 MG/0.5ML solution auto-injector, Inject 0.25 mg under the skin into the appropriate area as directed 1 (One) Time Per Week., Disp: 2 mL, Rfl: 0    sertraline (ZOLOFT) 100 MG tablet, Take 1.5 tablets by mouth Daily., Disp: 135 tablet, Rfl: 3    triamcinolone (KENALOG) 0.1 % ointment, Apply 1 application topically to the appropriate area as directed 2 (Two) Times a Day., Disp: 80 g, Rfl: 1    Turmeric (QC TUMERIC COMPLEX PO), Take 1 capsule by mouth Daily., Disp: , Rfl:     Vibegron (Gemtesa) 75 MG tablet, Take 1 tablet by mouth Daily., Disp: 30 tablet, Rfl: 3    Medication Considerations:  GISELLE reviewed and appropriate.      Allergies:   Allergies   Allergen Reactions    Erythromycin GI Intolerance       Objective     Physical Exam:  Vital Signs:   Vitals:    02/27/24 1531   Weight: 104 kg (230 lb)   Height: 162.6 cm (64\")     Body mass index is 39.48 kg/m².     Mental Status Exam:   Hygiene:   good  Cooperation:  Cooperative  Eye Contact:  Good  Psychomotor Behavior:  Appropriate  Affect:  Appropriate  Mood: anxious  Speech:  Normal  Thought Process:  Linear  Thought Content:  Mood congruent  Suicidal:  None  Homicidal:  None  Hallucinations:  None  Delusion:  None  Memory:  Intact  Orientation:  Grossly intact  Reliability:  good  Insight:  Good  Judgement:  Good  Impulse Control:  Good  Physical/Medical Issues:  Yes broken left fibula, fell in snow, required surgery.      Assessment / Plan      Visit Diagnosis/Orders " Placed This Visit:  Diagnoses and all orders for this visit:    1. Attention deficit hyperactivity disorder (ADHD), predominantly inattentive type (Primary)  -     lisdexamfetamine (VYVANSE) 50 MG capsule; Take 1 capsule by mouth Every Morning  Dispense: 30 capsule; Refill: 0    2. Generalized anxiety disorder  -     hydrOXYzine (ATARAX) 50 MG tablet; Take 1 tablet by mouth every night at bedtime as needed for sleep/anxiety.  Dispense: 30 tablet; Refill: 1    3. Circadian rhythm sleep disorder, unspecified type  -     hydrOXYzine (ATARAX) 50 MG tablet; Take 1 tablet by mouth every night at bedtime as needed for sleep/anxiety.  Dispense: 30 tablet; Refill: 1         Functional Status: Moderate impairment     Prognosis: Good with Ongoing Treatment     Impression/Formulation:  Patient appeared alert and oriented.  Patient is receptive to assistance with maintaining a stable lifestyle.  Patient presents with history of     ICD-10-CM ICD-9-CM   1. Attention deficit hyperactivity disorder (ADHD), predominantly inattentive type  F90.0 314.00   2. Generalized anxiety disorder  F41.1 300.02   3. Circadian rhythm sleep disorder, unspecified type  G47.20 327.30   .     Treatment Plan:     Continue vyvanse, vistaril  Patient will continue supportive psychotherapy efforts and medications as indicated. Clinic will obtain release of information for current treatment team for continuity of care as needed. Patient will contact this office, call 911 or present to the nearest emergency room should suicidal or homicidal ideations occur. Discussed medication options and treatment plan of prescribed medication(s) as well as the risks, benefits, and potential side effects. Patient ackowledged and verbally consented to continue with current treatment plan and was educated on the importance of compliance with treatment and follow-up appointments.     Follow Up:   Return in about 3 months (around 5/27/2024) for Med Check.        Lasha  LAURA Norman, PMHNP-BC  [facility]     This is electronically signed by LAURA Fox, WILL-BC  [unfilled] 17:22 EST

## 2024-02-28 ENCOUNTER — TELEPHONE (OUTPATIENT)
Dept: BEHAVIORAL HEALTH | Facility: CLINIC | Age: 59
End: 2024-02-28
Payer: COMMERCIAL

## 2024-02-28 NOTE — TELEPHONE ENCOUNTER
CALLED PATIENT TO SCHEDULE 3 MTH MED CHECK AROUND 5/28/24 WITH MONTRELL COLÓN. MAILBOX IS FULL, COULD NOT LEAVE MESSAGE

## 2024-03-18 DIAGNOSIS — F90.0 ATTENTION DEFICIT HYPERACTIVITY DISORDER (ADHD), PREDOMINANTLY INATTENTIVE TYPE: Primary | ICD-10-CM

## 2024-03-18 DIAGNOSIS — F90.0 ATTENTION DEFICIT HYPERACTIVITY DISORDER (ADHD), PREDOMINANTLY INATTENTIVE TYPE: ICD-10-CM

## 2024-03-18 RX ORDER — LISDEXAMFETAMINE DIMESYLATE CAPSULES 50 MG/1
50 CAPSULE ORAL EVERY MORNING
Qty: 30 CAPSULE | Refills: 0 | OUTPATIENT
Start: 2024-03-18

## 2024-03-18 NOTE — TELEPHONE ENCOUNTER
Let patient know she needs to sign CSA and have appropriate UDS to continue. Stop in during office hours, I will go ahead and put in the order for the UDS.  Thanks

## 2024-03-18 NOTE — TELEPHONE ENCOUNTER
Patient called requesting her refill be sent to another pharmacy due to shortages. New pharmacy attached to Rx order.

## 2024-03-19 ENCOUNTER — TELEPHONE (OUTPATIENT)
Dept: BEHAVIORAL HEALTH | Facility: CLINIC | Age: 59
End: 2024-03-19
Payer: COMMERCIAL

## 2024-03-19 DIAGNOSIS — F90.0 ATTENTION DEFICIT HYPERACTIVITY DISORDER (ADHD), PREDOMINANTLY INATTENTIVE TYPE: Primary | ICD-10-CM

## 2024-03-19 NOTE — TELEPHONE ENCOUNTER
Patient called back and stated that she is still unable to get the Vyvanse Rx filled. Patient is wanting to know if she could switch to a different medication given how difficult it has been for her to get her Rx that past couple of months?

## 2024-03-20 RX ORDER — DEXMETHYLPHENIDATE HYDROCHLORIDE 20 MG/1
20 CAPSULE, EXTENDED RELEASE ORAL DAILY
Qty: 30 CAPSULE | Refills: 0 | Status: SHIPPED | OUTPATIENT
Start: 2024-03-20

## 2024-03-25 ENCOUNTER — OFFICE VISIT (OUTPATIENT)
Dept: ORTHOPEDIC SURGERY | Facility: CLINIC | Age: 59
End: 2024-03-25
Payer: COMMERCIAL

## 2024-03-25 DIAGNOSIS — Z98.890 HISTORY OF ANKLE SURGERY: ICD-10-CM

## 2024-03-25 DIAGNOSIS — Z87.81 S/P ORIF (OPEN REDUCTION INTERNAL FIXATION) FRACTURE: ICD-10-CM

## 2024-03-25 DIAGNOSIS — Z98.890 S/P ORIF (OPEN REDUCTION INTERNAL FIXATION) FRACTURE: ICD-10-CM

## 2024-03-25 DIAGNOSIS — Z09 FRACTURE FOLLOW-UP: Primary | ICD-10-CM

## 2024-03-25 PROCEDURE — 99024 POSTOP FOLLOW-UP VISIT: CPT | Performed by: ORTHOPAEDIC SURGERY

## 2024-03-25 NOTE — PROGRESS NOTES
[facility] Follow Up     Office Follow Up Visit     Date: 03/25/2024   Patient Name: Liya Sales  MRN: [id number]  YOB: 1965  Chief Complaint:   Chief Complaint   Patient presents with    Post-op     6 week follow up -- 2 months s/p ANKLE OPEN REDUCTION INTERNAL FIXATION, SYNDESMOTIC FIXATION - LEFT (DOS 1/30/24)     History of Present Illness:   Liya Sales is a 58 y.o. female who is here today for follow up status post left ankle ORIF with syndesmotic ORIF on January 30.  Has been nonweightbearing in a short leg cast since that time.  Pain well-controlled.  No new complaints.    Subjective   I reviewed the patient's chief complaint, history of present illness, review of systems, past medical history, surgical history, family history, social history, medications and allergy list   Objective    Vital Signs: There were no vitals filed for this visit.  There is no height or weight on file to calculate BMI.    Ortho Exam:  left LE Foot and Ankle Exam:   Splint taken down for exam.  Leg compartments soft and compressible.  Incision healing well and clean dry and intact laterally, Steri-Strips in place, no drainage or erythema.  Able to actively plantarflex and dorsiflex ankle and all toes.  Appropriate swelling for this stage of healing about the foot and ankle.  Toes warm and well-perfused.  Brisk cap refill in all toes.     Results Review:  XR Ankle 3+ View Left  Left Ankle X-Ray 03/25/24   Indication: Pain  Views: 3 simulated weight bearing , comparison to previous  Findings: xrays reviewed by me today in the office and show, hardware in   place with proper alignment, no signs of hardware failure, ankle mortise   well-maintained        Assessment / Plan    Assessment/Plan:   Diagnoses and all orders for this visit:    1. Fracture follow-up (Primary)  -     XR Ankle 3+ View Left  -     Ambulatory Referral to Physical Therapy Evaluate and  treat    2. History of ankle surgery  -     Ambulatory Referral to Physical Therapy Evaluate and treat    3. S/P ORIF (open reduction internal fixation) fracture, left ankle, 1/30/24      Patient is now 8 weeks postop doing well.  Is happy with her progress.  Patient a tall cam walking boot in clinic today of which she can begin weightbearing with use of assistive devices as needed.  Steri-Strips placed today back over incision.  Counseled patient she can shower but still no soaks of the incision site.  Continue to elevate during recovery.  Can now discontinue ASA for DVT prophylaxis.  Provided patient with prescription for physical therapy, functional ankle rehab.  Plan to see patient back in clinic in 4 weeks for repeat x-rays and reevaluation.  Patient counseled to contact clinic sooner if any concerns arise.    Follow Up:   Return in about 4 weeks (around 4/22/2024) for Recheck, F/U with Images.      Jasvir Tucker MD  [facility] Surgeon

## 2024-03-26 ENCOUNTER — TELEPHONE (OUTPATIENT)
Dept: BEHAVIORAL HEALTH | Facility: CLINIC | Age: 59
End: 2024-03-26
Payer: COMMERCIAL

## 2024-04-01 ENCOUNTER — PATIENT MESSAGE (OUTPATIENT)
Dept: ORTHOPEDIC SURGERY | Facility: CLINIC | Age: 59
End: 2024-04-01
Payer: COMMERCIAL

## 2024-04-04 ENCOUNTER — OFFICE VISIT (OUTPATIENT)
Dept: FAMILY MEDICINE CLINIC | Facility: CLINIC | Age: 59
End: 2024-04-04
Payer: COMMERCIAL

## 2024-04-04 VITALS
WEIGHT: 229 LBS | BODY MASS INDEX: 39.09 KG/M2 | SYSTOLIC BLOOD PRESSURE: 126 MMHG | HEART RATE: 76 BPM | HEIGHT: 64 IN | OXYGEN SATURATION: 98 % | DIASTOLIC BLOOD PRESSURE: 82 MMHG

## 2024-04-04 DIAGNOSIS — M15.9 PRIMARY OSTEOARTHRITIS INVOLVING MULTIPLE JOINTS: ICD-10-CM

## 2024-04-04 DIAGNOSIS — E66.01 CLASS 2 SEVERE OBESITY DUE TO EXCESS CALORIES WITH SERIOUS COMORBIDITY AND BODY MASS INDEX (BMI) OF 39.0 TO 39.9 IN ADULT: ICD-10-CM

## 2024-04-04 DIAGNOSIS — Z87.81 S/P ORIF (OPEN REDUCTION INTERNAL FIXATION) FRACTURE: Primary | ICD-10-CM

## 2024-04-04 DIAGNOSIS — Z98.890 S/P ORIF (OPEN REDUCTION INTERNAL FIXATION) FRACTURE: Primary | ICD-10-CM

## 2024-04-04 DIAGNOSIS — E78.5 DYSLIPIDEMIA: ICD-10-CM

## 2024-04-04 DIAGNOSIS — R06.83 SNORES: ICD-10-CM

## 2024-04-04 NOTE — PROGRESS NOTES
"Subjective   Liya Sales is a 58 y.o. female.   Chief Complaint   Patient presents with    Weight Check       History of Present Illness   Patient is here for follow up for obesity, her insurance has changed, she was approved for wegovy, but insurance change and difficulty finding wegovy prevented obtaining prescription  Upset today because her 17 year old dog  last week  The following portions of the patient's history were reviewed and updated as appropriate: allergies, current medications, past family history, past medical history, past social history, past surgical history, and problem list.    Review of Systems   Constitutional:  Negative for unexpected weight change.   Respiratory:  Negative for shortness of breath.    Cardiovascular:  Negative for chest pain.   Musculoskeletal:  Positive for arthralgias.       Objective   Physical Exam  Vitals reviewed.   Constitutional:       Appearance: Normal appearance.   Cardiovascular:      Rate and Rhythm: Normal rate and regular rhythm.   Pulmonary:      Effort: Pulmonary effort is normal. No respiratory distress.      Breath sounds: Normal breath sounds.   Neurological:      Mental Status: She is alert and oriented to person, place, and time.      Gait: Gait abnormal (left LE boot).   Psychiatric:         Thought Content: Thought content normal.         Judgment: Judgment normal.       /82 (BP Location: Right arm, Patient Position: Sitting, Cuff Size: Adult)   Pulse 76   Ht 162.6 cm (64.02\")   Wt 104 kg (229 lb)   SpO2 98%   BMI 39.29 kg/m²     Assessment & Plan   Problems Addressed this Visit       S/P ORIF (open reduction internal fixation) fracture, left ankle, 24 - Primary    Obesity    Relevant Medications    Tirzepatide-Weight Management (ZEPBOUND) 2.5 MG/0.5ML solution auto-injector    Other Relevant Orders    Ambulatory Referral to Sleep Medicine     Other Visit Diagnoses       Primary osteoarthritis involving multiple joints     "    Relevant Medications    Tirzepatide-Weight Management (ZEPBOUND) 2.5 MG/0.5ML solution auto-injector    Dyslipidemia        Relevant Medications    Tirzepatide-Weight Management (ZEPBOUND) 2.5 MG/0.5ML solution auto-injector    Snores        Relevant Orders    Ambulatory Referral to Sleep Medicine          Diagnoses         Codes Comments    S/P ORIF (open reduction internal fixation) fracture, left ankle, 1/30/24    -  Primary ICD-10-CM: Z98.890, Z87.81  ICD-9-CM: V45.89, V15.51     Class 2 severe obesity due to excess calories with serious comorbidity and body mass index (BMI) of 39.0 to 39.9 in adult     ICD-10-CM: E66.01, Z68.39  ICD-9-CM: 278.01, V85.39     Primary osteoarthritis involving multiple joints     ICD-10-CM: M15.9  ICD-9-CM: 715.98     Dyslipidemia     ICD-10-CM: E78.5  ICD-9-CM: 272.4     Snores     ICD-10-CM: R06.83  ICD-9-CM: 786.09           Prescription for Zepbound sent to pharmacy.  If insurance does not approve this we will try to get Wegovy again.  Patient's (Body mass index is 39.29 kg/m².) indicates that they are obese (BMI >30) with obesity-related health conditions that include hypertension, dyslipidemias, GERD, and osteoarthritis . Obesity is unchanged. BMI is is above average; BMI management plan is completed. We discussed portion control, increasing exercise, and pharmacologic options including Zepbound or Wegovy .   Patient is followed by orthopedic surgery for osteoarthritis  Referred for sleep study due to snoring, hypertension in obese patient  Nutrition and activity goals reviewed including: mainly water to drink, limit white flour/processed sugar, and processed foods, choose fresh fruits, vegetables, fish, lean meats,high fiber carbs, exercise  working toward 150 mins cardio per week, weight training 2x/week.  Patient was encouraged to keep me informed of any acute changes, lack of improvement, or any new concerning symptoms.

## 2024-04-25 DIAGNOSIS — F90.0 ATTENTION DEFICIT HYPERACTIVITY DISORDER (ADHD), PREDOMINANTLY INATTENTIVE TYPE: ICD-10-CM

## 2024-04-25 DIAGNOSIS — G47.20 CIRCADIAN RHYTHM SLEEP DISORDER, UNSPECIFIED TYPE: ICD-10-CM

## 2024-04-25 DIAGNOSIS — F41.1 GENERALIZED ANXIETY DISORDER: ICD-10-CM

## 2024-04-25 NOTE — TELEPHONE ENCOUNTER
Incoming Refill Request      Medication requested (name and dose):     DEXMETHYLPHENIDATE XR (FOCALIN XR) 20 MG CAPSEULE    HYDROXYZINE (ATARAX) 50 MG TABLET     Pharmacy where request should be sent:     [facility]     Additional details provided by patient:     PATIENT HAS APPT ON 07/10/24     Best call back number: [phone]    Does the patient have less than a 3 day supply:  [] Yes  [] No    Armando Garrett Rep  04/25/24, 10:20 EDT        {Tip  DIRECTIONS Send the encounter HIGH priority, If patient has less than a 3 day supply. If the patient will run out of medication over the weekend add that information to the additional details line. Send this encounter to the clinical pool:[id number]

## 2024-04-25 NOTE — TELEPHONE ENCOUNTER
Rx Refill Note    Requested Prescriptions     Pending Prescriptions Disp Refills    dexmethylphenidate XR (Focalin XR) 20 MG 24 hr capsule 30 capsule 0     Sig: Take 1 capsule by mouth Daily    hydrOXYzine (ATARAX) 50 MG tablet 30 tablet 0     Sig: Take 1 tablet by mouth every night at bedtime as needed for sleep/anxiety.        Last office visit with prescribing clinician: 12/5/2023      Next office visit with prescribing clinician: 7/10/2024   Last labs:   Last refill: needs   Pharmacy (be sure to add in Epic). correct

## 2024-04-26 ENCOUNTER — OFFICE VISIT (OUTPATIENT)
Dept: ORTHOPEDIC SURGERY | Facility: CLINIC | Age: 59
End: 2024-04-26
Payer: COMMERCIAL

## 2024-04-26 DIAGNOSIS — Z09 FRACTURE FOLLOW-UP: Primary | ICD-10-CM

## 2024-04-26 NOTE — PROGRESS NOTES
[facility] Up     Office Follow Up Visit     Date: 04/26/2024   Patient Name: Liya Sales  MRN: [id number]  YOB: 1965  Chief Complaint:   Chief Complaint   Patient presents with    Post-op     1 month follow up -- 3 months s/p ANKLE OPEN REDUCTION INTERNAL FIXATION, SYNDESMOTIC FIXATION - LEFT (DOS 1/30/24)     History of Present Illness:   Liya Sales is a 58 y.o. female who is here today for follow up for status post left ankle ORIF with syndesmotic ORIF on January 30.  Has been weightbearing as tolerated in tall cam walking boot since last visit on March 25.  Denies pain.  Continues to improve.  States has not started physical therapy because there were issues with getting it scheduled.  Overall happy with progress.  No new complaints.    Subjective   I reviewed the patient's chief complaint, history of present illness, review of systems, past medical history, surgical history, family history, social history, medications and allergy list   Objective    Vital Signs: There were no vitals filed for this visit.  There is no height or weight on file to calculate BMI.    Ortho Exam:  left LE Foot and Ankle Exam:   Boot removed for exam.  Leg compartments soft and compressible.  Incision lateral ankle well-healed.  Able to actively plantarflex and dorsiflex ankle and all toes.  Minimal swelling at surgical site.  Toes warm and well-perfused.  Brisk cap refill in all toes.  Ambulates in clinic with a nonantalgic gait.  Can do single limb heel rise on the right.  Difficulty doing single limb heel rise on the left.  Visible pes planus.    Results Review:  XR Ankle 3+ View Left  Left Ankle X-Ray 04/26/24   Indication: Pain  Views: 3 simulated weight bearing , comparison to previous  Findings: xrays reviewed by me today in the office and show, hardware in   place with proper alignment, no signs of hardware failure, ankle mortise    well-maintained, interval signs of bony healing at fracture site distal   fibula       Assessment / Plan    Assessment/Plan:   Diagnoses and all orders for this visit:    1. Fracture follow-up (Primary)  -     XR Ankle 3+ View Left      Patient is now 12 weeks postop doing well.  Continues to be happy with her progress.  Has not started physical therapy due to scheduling issues.  Provided patient with new referral to physical therapy which she is to start immediately.  Patient can wean out of boot and into normal shoe.  Provided patient with a ankle brace in clinic today which she can begin wearing as well.  Call patient she can wean out of the brace as she progresses with physical therapy.  Plan to see patient back in 6 weeks for repeat x-rays and reevaluation.    Follow Up:   Return in about 6 weeks (around 6/7/2024) for Recheck, F/U with Images.      Jasvir Tucker MD  [facility] Orthopedic Surgeon

## 2024-04-29 ENCOUNTER — TELEPHONE (OUTPATIENT)
Dept: BEHAVIORAL HEALTH | Facility: CLINIC | Age: 59
End: 2024-04-29
Payer: COMMERCIAL

## 2024-04-29 RX ORDER — HYDROXYZINE 50 MG/1
TABLET, FILM COATED ORAL
Qty: 30 TABLET | Refills: 0 | Status: SHIPPED | OUTPATIENT
Start: 2024-04-29

## 2024-04-29 RX ORDER — DEXMETHYLPHENIDATE HYDROCHLORIDE 20 MG/1
20 CAPSULE, EXTENDED RELEASE ORAL DAILY
Qty: 30 CAPSULE | Refills: 0 | OUTPATIENT
Start: 2024-04-29

## 2024-04-29 NOTE — TELEPHONE ENCOUNTER
CALLED PATIENT TO COME IN AND COMPLETE CONTROLLED SUBSTANCE AGREEMENT AND COMPLETE AN UDS BEFORE PRESCRIPTIONS CAN BE FILLED.       LEFT DETAILED MESSAGE

## 2024-04-30 ENCOUNTER — CLINICAL SUPPORT (OUTPATIENT)
Dept: BEHAVIORAL HEALTH | Facility: CLINIC | Age: 59
End: 2024-04-30
Payer: COMMERCIAL

## 2024-04-30 ENCOUNTER — TELEPHONE (OUTPATIENT)
Dept: BEHAVIORAL HEALTH | Facility: CLINIC | Age: 59
End: 2024-04-30

## 2024-04-30 DIAGNOSIS — F90.0 ATTENTION DEFICIT HYPERACTIVITY DISORDER (ADHD), PREDOMINANTLY INATTENTIVE TYPE: ICD-10-CM

## 2024-04-30 DIAGNOSIS — F90.0 ATTENTION DEFICIT HYPERACTIVITY DISORDER (ADHD), PREDOMINANTLY INATTENTIVE TYPE: Primary | ICD-10-CM

## 2024-04-30 LAB
BUPRENORPHINE SERPL-MCNC: NEGATIVE NG/ML
MDMA UR QL SCN: NEGATIVE
POC AMPHETAMINES: NEGATIVE
POC BARBITURATES: NEGATIVE
POC BENZODIAZEPHINES: NEGATIVE
POC COCAINE: NEGATIVE
POC METHADONE: NEGATIVE
POC METHAMPHETAMINE SCREEN URINE: NEGATIVE
POC OPIATES: NEGATIVE
POC OXYCODONE: NEGATIVE
POC PHENCYCLIDINE: NEGATIVE
POC THC: NEGATIVE

## 2024-04-30 RX ORDER — DEXMETHYLPHENIDATE HYDROCHLORIDE 20 MG/1
20 CAPSULE, EXTENDED RELEASE ORAL DAILY
Qty: 30 CAPSULE | Refills: 0 | Status: SHIPPED | OUTPATIENT
Start: 2024-04-30

## 2024-04-30 NOTE — TELEPHONE ENCOUNTER
PATIENT CAME IN FOR UDS AND SIGNED CSA AGREEMENT    WANTED TO KNOW IF HER REFILLS COULD BE CALLED IN

## 2024-06-25 ENCOUNTER — TELEPHONE (OUTPATIENT)
Dept: ORTHOPEDIC SURGERY | Facility: CLINIC | Age: 59
End: 2024-06-25
Payer: MEDICAID

## 2024-06-25 NOTE — TELEPHONE ENCOUNTER
----- Message from [facility] sent at 6/25/2024 12:05 PM EDT -----  Regarding: Appointment Request  Contact: [phone]  Appointment Request From: Liya Sales    With Provider: Jasvir Tucker [[facility] ORTHOPEDICS & SPORTS MEDICINE]    Preferred Date Range: 6/26/2024 – 7/3/2024    Preferred Times: Any Time    Reason for visit: Follow-up    Comments:  I had to miss last appointment due to insurance issues.   
CALLED PATIENT TO SCHEDULE APPT, AND LEFT   *HUB OK TO RELAY, AND SCHEDULE*  
Statement Selected

## 2024-07-01 ENCOUNTER — OFFICE VISIT (OUTPATIENT)
Dept: ORTHOPEDIC SURGERY | Facility: CLINIC | Age: 59
End: 2024-07-01
Payer: MEDICAID

## 2024-07-01 VITALS
WEIGHT: 230.8 LBS | SYSTOLIC BLOOD PRESSURE: 128 MMHG | DIASTOLIC BLOOD PRESSURE: 75 MMHG | BODY MASS INDEX: 39.4 KG/M2 | HEIGHT: 64 IN

## 2024-07-01 DIAGNOSIS — M17.12 PRIMARY OSTEOARTHRITIS OF LEFT KNEE: Primary | ICD-10-CM

## 2024-07-01 DIAGNOSIS — E66.09 CLASS 2 OBESITY DUE TO EXCESS CALORIES WITHOUT SERIOUS COMORBIDITY WITH BODY MASS INDEX (BMI) OF 39.0 TO 39.9 IN ADULT: ICD-10-CM

## 2024-07-01 NOTE — PROGRESS NOTES
"    [facility] Note        Subjective     CC: Follow-up (8 month follow up-Primary osteoarthritis of left knee)      HPI    Liya Sales is a 58 y.o. female.  Established patient presents for evaluation of left knee pain.  Patient had completed viscosupplementation series 1/4/2024.  Prior to that she had a corticosteroid injection in October 2023.  In January 2024 she sustained a left ankle fracture requiring ORIF.  She is beginning to have increased knee pain.  She cannot remember how well the injections in the past worked due to her history of left ankle fracture.    Pain scale: 1/10 currently.  Associated symptoms pain, swelling, popping.  Activity related to pain walking.  Pain eased by ice.  No reported numbness or tingling.  Prior treatments currently attending PT for her ankle.    Overall, patient's symptoms are unchanged.    ROS:    Constiutional:Pt denies fever, chills, nausea, or vomiting.  MSK:as above        Objective      Past Medical History  Past Medical History:   Diagnosis Date    Allergies     Anxiety     Arthritis     left ankle    Carpal tunnel syndrome on right     Depression     Fracture, fibula 1/19/2024    Knee swelling 10/22    Overactive bladder     Tear of meniscus of knee 10/22     Social History     Socioeconomic History    Marital status:     Number of children: 3   Tobacco Use    Smoking status: Never     Passive exposure: Never    Smokeless tobacco: Never   Vaping Use    Vaping status: Never Used   Substance and Sexual Activity    Alcohol use: Yes     Alcohol/week: 2.0 standard drinks of alcohol     Types: 2 Glasses of wine per week     Comment: hard apple cider and wine    Drug use: No    Sexual activity: Yes     Partners: Male     Birth control/protection: Post-menopausal          Physical Exam  /75   Ht 162.6 cm (64.02\")   Wt 105 kg (230 lb 12.8 oz)   BMI 39.60 kg/m²     Body mass index is 39.6 kg/m².    Patient is well nourished and " well developed.        Ortho Exam  Left knee  Alignment: Varus  Skin: Intact without any erythema, warmth.  Trace effusion.  Motion: 0-120° with crepitus.  Tenderness: Pain noted throughout the knee but more pronounced along medial joint line.  Instability: Lachman negative.  Stable to varus and valgus stress at terminal extension and 30 degrees. MCL--retensioning of the MCL is appreciated with valgus stress at 30 degrees consistent with medial compartment degeneration   Patella: Compression/grind positive.  Straight leg raise: Intact.  Motor: Grossly intact Q/HS/TA/GS/EHL/P  Sensory: Grossly intact DP/SP/S/S/T nerve distributions.       Imaging/Labs/EMG Reviewed and Interpreted:  No new imaging today.      Assessment:  1. Primary osteoarthritis of left knee    2. Class 2 obesity due to excess calories without serious comorbidity with body mass index (BMI) of 39.0 to 39.9 in adult        Plan:  Left knee osteoarthritis.  Treatment options were discussed with the patient.  She notes at this time the pain is tolerable on does not believe she requires corticosteroid injection.  Would like to proceed with viscosupplementation series but will not be able to start until after 7/5/2024.  Placed preauthorization for visco supplementation.  Recommend OTC NSAIDS/pain medication as needed.  Obesity--patient has a BMI of 39.6.  The patient has been instructed on various weight loss avenues including diet, portion control, calorie restriction.  Because of her recovery following left ankle ORIF will need to rely on exercises taught by PT and non impact exercise.  It was explained that weight loss can improve joint pain alone by decreasing the joint reaction forces.  For every pound of weight change, the knee and hip joints see a 4 to 5 fold change in pressure.    Follow up in in July when able to proceed with viscosupplementation series.  Questions and concerns answered.      aNncy Moore PA-C  07/05/24  14:09  EDT      Dictated Utilizing Dragon Dictation.

## 2024-07-02 ENCOUNTER — PATIENT MESSAGE (OUTPATIENT)
Dept: FAMILY MEDICINE CLINIC | Facility: CLINIC | Age: 59
End: 2024-07-02
Payer: MEDICAID

## 2024-07-03 ENCOUNTER — PRIOR AUTHORIZATION (OUTPATIENT)
Dept: FAMILY MEDICINE CLINIC | Facility: CLINIC | Age: 59
End: 2024-07-03
Payer: MEDICAID

## 2024-07-03 NOTE — TELEPHONE ENCOUNTER
Outcome  Approved today  The request has been approved. The authorization is effective from 07/03/2024 to 07/03/2025, as long as the member is enrolled in their current health plan. A written notification letter will follow with additional details.

## 2024-07-10 ENCOUNTER — TELEPHONE (OUTPATIENT)
Dept: BEHAVIORAL HEALTH | Facility: CLINIC | Age: 59
End: 2024-07-10

## 2024-07-10 ENCOUNTER — TELEMEDICINE (OUTPATIENT)
Dept: BEHAVIORAL HEALTH | Facility: CLINIC | Age: 59
End: 2024-07-10
Payer: MEDICAID

## 2024-07-10 VITALS — BODY MASS INDEX: 39.27 KG/M2 | HEIGHT: 64 IN | WEIGHT: 230 LBS

## 2024-07-10 DIAGNOSIS — F90.0 ATTENTION DEFICIT HYPERACTIVITY DISORDER (ADHD), PREDOMINANTLY INATTENTIVE TYPE: Primary | ICD-10-CM

## 2024-07-10 DIAGNOSIS — G47.20 CIRCADIAN RHYTHM SLEEP DISORDER, UNSPECIFIED TYPE: ICD-10-CM

## 2024-07-10 DIAGNOSIS — F33.0 MILD EPISODE OF RECURRENT MAJOR DEPRESSIVE DISORDER: ICD-10-CM

## 2024-07-10 DIAGNOSIS — F41.1 GENERALIZED ANXIETY DISORDER: ICD-10-CM

## 2024-07-10 RX ORDER — SERTRALINE HYDROCHLORIDE 100 MG/1
150 TABLET, FILM COATED ORAL DAILY
Qty: 135 TABLET | Refills: 3 | Status: SHIPPED | OUTPATIENT
Start: 2024-07-10

## 2024-07-10 RX ORDER — DEXMETHYLPHENIDATE HYDROCHLORIDE 20 MG/1
20 CAPSULE, EXTENDED RELEASE ORAL DAILY
Qty: 30 CAPSULE | Refills: 0 | Status: SHIPPED | OUTPATIENT
Start: 2024-07-10

## 2024-07-10 RX ORDER — HYDROXYZINE 50 MG/1
TABLET, FILM COATED ORAL
Qty: 30 TABLET | Refills: 2 | Status: SHIPPED | OUTPATIENT
Start: 2024-07-10

## 2024-07-10 RX ORDER — MIRABEGRON 50 MG/1
1 TABLET, EXTENDED RELEASE ORAL DAILY
COMMUNITY
Start: 2024-07-06

## 2024-07-10 NOTE — PROGRESS NOTES
Video Visit      Patient Name: Liya Sales  : 1965   MRN: [id number]     Referring Provider: Vaishnavi Verma APRN    Chief Complaint:      ICD-10-CM ICD-9-CM   1. Attention deficit hyperactivity disorder (ADHD), predominantly inattentive type  F90.0 314.00   2. Generalized anxiety disorder  F41.1 300.02   3. Circadian rhythm sleep disorder, unspecified type  G47.20 327.30   4. Mild episode of recurrent major depressive disorder  F33.0 296.31        This provider is located at the [facility] (through [facility]), [address] [place] [address] using a secure Match[employer] Video Visit through [employer]. Patient is being seen remotely via telehealth video visit at their home address in [place], and stated they are in a secure environment for this session. The patient's condition being diagnosed/treated is appropriate for telemedicine. The provider identified herself as well as her credentials. The patient, and/or patients guardian, consent to be seen remotely, and when consent is given they understand that the consent allows for patient identifiable information to be sent to a third party as needed. They may refuse to be seen remotely at any time. The electronic data is encrypted and password protected, and the patient and/or guardian has been advised of the potential risks to privacy not withstanding such measures.    The patient has chosen to receive care today through a telehealth video visit. Do you consent to use a video/audio connection for your medical care today? Yes    History of Present Illness:   Liya Sales is a 58 y.o. female who is being seen by a video visit today for follow up with psychiatric medications.    Subjective     I am starting a new job in [place], I will be back and forth here to my home base.  I need to get back on my medicine because I need to be on my A game.  Otherwise, feeling pretty good about things.        Review of Systems:   Review of Systems   Psychiatric/Behavioral:  Positive for decreased concentration and stress. The patient is nervous/anxious.        Screening Scores:   PHQ-9 : 0  ZENAIDA-7 : 0    RISK ASSESSMENT:  Patient denies any high risk factors today.     Medications:     Current Outpatient Medications:     acetaminophen (TYLENOL) 500 MG tablet, Take 1 tablet by mouth Every 6 (Six) Hours As Needed for Mild Pain., Disp: 30 tablet, Rfl: 0    ammonium lactate (AmLactin) 12 % lotion, Apply  topically to the appropriate area as directed As Needed for Dry Skin., Disp: 225 g, Rfl: 1    atorvastatin (LIPITOR) 40 MG tablet, TAKE 1 TABLET BY MOUTH EVERY DAY AT NIGHT, Disp: 90 tablet, Rfl: 1    dexmethylphenidate XR (Focalin XR) 20 MG 24 hr capsule, Take 1 capsule by mouth Daily, Disp: 30 capsule, Rfl: 0    famotidine (PEPCID) 40 MG tablet, TAKE 1 TABLET BY MOUTH EVERY DAY, Disp: 90 tablet, Rfl: 0    hydrOXYzine (ATARAX) 50 MG tablet, Take 1 tablet by mouth every night at bedtime as needed for sleep/anxiety., Disp: 30 tablet, Rfl: 2    levocetirizine (Xyzal Allergy 24HR) 5 MG tablet, Take 1 tablet by mouth Every Evening., Disp: , Rfl:     Mirabegron ER (MYRBETRIQ) 50 MG tablet sustained-release 24 hour 24 hr tablet, Take 50 mg by mouth Daily., Disp: , Rfl:     multivitamin with minerals tablet tablet, Take 1 tablet by mouth Daily., Disp: , Rfl:     omeprazole (priLOSEC) 40 MG capsule, TAKE 1 CAPSULE BY MOUTH EVERY DAY, Disp: 90 capsule, Rfl: 1    sertraline (ZOLOFT) 100 MG tablet, Take 1.5 tablets by mouth Daily., Disp: 135 tablet, Rfl: 3    triamcinolone (KENALOG) 0.1 % ointment, Apply 1 application topically to the appropriate area as directed 2 (Two) Times a Day., Disp: 80 g, Rfl: 1    Turmeric (QC TUMERIC COMPLEX PO), Take 1 capsule by mouth Daily., Disp: , Rfl:     Tirzepatide-Weight Management (ZEPBOUND) 2.5 MG/0.5ML solution auto-injector, Inject 0.5 mL under the skin into the appropriate area as directed 1  "(One) Time Per Week. Indications: OBESITY, Has had difficulty finding wegovy (Patient not taking: Reported on 7/10/2024), Disp: 2 mL, Rfl: 0    Medication Considerations:  GISELLE reviewed and appropriate.      Allergies:   Allergies   Allergen Reactions    Erythromycin GI Intolerance       Objective     Physical Exam:  Vital Signs:   Vitals:    07/10/24 1338   Weight: 104 kg (230 lb)   Height: 162.6 cm (64\")     Body mass index is 39.48 kg/m².     Mental Status Exam:   Hygiene:   good  Cooperation:  Cooperative  Eye Contact:  Fair  Psychomotor Behavior:  Appropriate  Affect:  Appropriate  Mood: normal  Speech:  Normal  Thought Process:  Goal directed and Linear  Thought Content:  Normal  Suicidal:  None  Homicidal:  None  Hallucinations:  None  Delusion:  None  Memory:  Intact  Orientation:  Grossly intact  Reliability:  good  Insight:  Good  Judgement:  Good  Impulse Control:  Good  Physical/Medical Issues:   nothing reported today      Assessment / Plan      Visit Diagnosis/Orders Placed This Visit:  Diagnoses and all orders for this visit:    1. Attention deficit hyperactivity disorder (ADHD), predominantly inattentive type (Primary)  -     dexmethylphenidate XR (Focalin XR) 20 MG 24 hr capsule; Take 1 capsule by mouth Daily  Dispense: 30 capsule; Refill: 0    2. Generalized anxiety disorder  -     hydrOXYzine (ATARAX) 50 MG tablet; Take 1 tablet by mouth every night at bedtime as needed for sleep/anxiety.  Dispense: 30 tablet; Refill: 2  -     sertraline (ZOLOFT) 100 MG tablet; Take 1.5 tablets by mouth Daily.  Dispense: 135 tablet; Refill: 3    3. Circadian rhythm sleep disorder, unspecified type  -     hydrOXYzine (ATARAX) 50 MG tablet; Take 1 tablet by mouth every night at bedtime as needed for sleep/anxiety.  Dispense: 30 tablet; Refill: 2    4. Mild episode of recurrent major depressive disorder  -     sertraline (ZOLOFT) 100 MG tablet; Take 1.5 tablets by mouth Daily.  Dispense: 135 tablet; Refill: 3     "     Functional Status: Mild impairment     Prognosis: Good with Ongoing Treatment     Impression/Formulation:  Patient appeared alert and oriented.  Patient is receptive to assistance with maintaining a stable lifestyle.  Patient presents with history of     ICD-10-CM ICD-9-CM   1. Attention deficit hyperactivity disorder (ADHD), predominantly inattentive type  F90.0 314.00   2. Generalized anxiety disorder  F41.1 300.02   3. Circadian rhythm sleep disorder, unspecified type  G47.20 327.30   4. Mild episode of recurrent major depressive disorder  F33.0 296.31   .Patient screened negative for depression based on a PHQ-9 score of 0 on 7/10/2024. Follow-up recommendations include:  continue current medications .  Patient would like to reinitiate adhd medication due to new job, continues to take sertraline for depression/anxiety, working well.  Hydroxyzine helps sleep.    Treatment Plan:   Continue sertraline, hydroxyzine,   Reinitiate focalin xr    Patient will continue supportive psychotherapy efforts and medications as indicated. Clinic will obtain release of information for current treatment team for continuity of care as needed. Patient will contact this office, call 911 or present to the nearest emergency room should suicidal or homicidal ideations occur. Discussed medication options and treatment plan of prescribed medication(s) as well as the risks, benefits, and potential side effects. Patient ackowledged and verbally consented to continue with current treatment plan and was educated on the importance of compliance with treatment and follow-up appointments.     Follow Up:   Return in about 3 months (around 10/10/2024) for Med Check.        LAURA Fox, ABDOULAYE  [facility]     This is electronically signed by LAURA Fox, ABDOULAYE  [unfilled] 17:02 EDT

## 2024-07-12 ENCOUNTER — TELEPHONE (OUTPATIENT)
Dept: BEHAVIORAL HEALTH | Facility: CLINIC | Age: 59
End: 2024-07-12
Payer: MEDICAID

## 2024-07-12 NOTE — TELEPHONE ENCOUNTER
HEMATOLOGY/ONCOLOGY PROGRESS NOTE    Diagnosis: AML (20-25% blasts), with MDS related changes  Chromosome, NGS pending    History Of Present Illness  Edna is a 82 year old female who presents for planned admission to initiate therapy for newly diagnosed acute myeloid leukemia with antecedent MDS.  She is a well known patient to our practice, following with Dr. Panda zuñiga for anemia in 2020.  She underwent evaluation including bone marrow biopsy 6/2021 that showed hypercellularity but no overt dysplastic changes, with normal cytogenetics but abnormal NGS (ASXL1, CUX1, IDH2, SRSF2, and TET2).  She was HENRI positive at that time.  She was initiated on PRISCILA weekly and had adequate response (hgb ~9).  In 2021 she was diagnosed with grade II, stage III FL of the head and neck, s/p Rituximab weekly x4 that was completed in June of 2022.  In September of 2022 she was noted to have worsening cytopenias, development of severe neutropenia.  She underwent repeat bone marrow biopsy 10/2022 that showed acute myeloid leukemia (20-25%) with myelodysplastic changes.  Findings were discussed in clinic and she agreed to proceed to treatment with Vidaza/Venetoclax regimen.  She presents today for planned admission for C1D1 of therapy.  She is feeling tired but otherwise well.  She denies any fevers/chills, drenching night sweats.  She is anxious this morning.    11/1/2022: Day 2 of chemotherapy.  Feeling well.  Denies any new complaints.  Denies any nausea/vomiting.  Has mild constipation, will take miralax today.      11/3/2022: Day 3 of chemotherapy.  Feels well overall. Requires blood transfusion this morning.  Denies any nausea/vomiting.  Continues to have constipation, will take miralax BID today.  Ambulating well.    11/4/2022: Day 4 of chemotherapy.  No acute events; no fevers.  Chronic back pain, will try tylenol today.  She had BM yesterday with miralax.  She reports some discomfort around left arm IV, below the elbow.    PA for dexmethylphenidate was approved.       11/5/2022: Day 5 of chemotherapy.  Reports some congestion this morning and slight \"head cold\".  No fevers.  No cough.  L arm discomfort persists despite removal of IV and warm compresses.    11/5/2022: Diagnosed with COVID for which ID was consulted and she was started on Remdesivir and Bebtelovimab. Continues to have congestion. L arm discomfort a little better today.     11/6/2022: Endorses fatigue, congestion, sore throat, and mild headache unchanged from yesterday. Tmax 100. Otherwise, no new complaints.   November 7, 2022.  Feels okay, denies any breathlessness, completed 5 days of azacitidine and continues on venetoclax.  Denies any chills or rigors.  November 8, 2022.  Continues to feel okay and denies any chills rigors.  Does complain of a mild sore throat but is otherwise tolerating Venetoclax well.  November 9, 2022.  No change in overall status tolerating Venetoclax exceptionally well.  Given stable blood counts and her good tolerance we will look into the feasibility of continuing this as an outpatient  November 10, 2022.  Remains afebrile and overall quite stable.  Arrangements have been made for Venetoclax to be delivered to her home.  Accordingly we will discharge this afternoon.    Past Medical History  Past Medical History:   Diagnosis Date   • Cholelithiasis    • Diverticulosis    • H/O colonoscopy 09/08/2016   • H/O complete eye exam 2021   • H/O mammogram 11/18/2014   • Hx of colonic polyps    • Hyponatremia    • Lymphadenopathy    • Malignant neoplasm (CMS/HCC)    • Routine Papanicolaou smear 09/09/2021        Surgical History  Past Surgical History:   Procedure Laterality Date   • Biopsy salivary gland,needle Left    • Breast biopsy Bilateral    • Incision and drainage      infected lymph node neck   • Inguinal hernia repair     • Tubal ligation           Medications  Medications Prior to Admission   Medication Sig Dispense Refill   • aluminum-magnesium hydroxide-simethicone (Mylanta)  200-200-20 MG/5ML Suspension Take 10 mLs by mouth every evening.     • busPIRone (BUSPAR) 5 MG tablet Take 1 tablet by mouth in the morning and 1 tablet in the evening. 60 tablet 1   • atorvastatin (LIPITOR) 40 MG tablet Take 1 tablet by mouth daily. 90 tablet 0   • folic acid (FOLATE) 1 MG tablet Take 1 tablet by mouth daily. 30 tablet 1   • fluticasone (FLONASE) 50 MCG/ACT nasal spray Spray 1 spray in each nostril every other day. (Patient taking differently: Spray 1 spray in each nostril 2 times daily as needed.) 16 g 12   • polyethylene glycol (GLYCOLAX, MIRALAX) packet Take 17 g by mouth every evening.         Review of Systems  10 systems reviewed, negative except noted in HPI     Last Recorded Vitals  Blood pressure (!) 148/79, pulse 77, temperature 97.3 °F (36.3 °C), temperature source Oral, resp. rate 18, height 4' 11\" (1.499 m), weight 54.9 kg (121 lb 0.5 oz), SpO2 98 %.    Physical Exam  General:  Alert and oriented. No acute distress.  Head:  Normocephalic, without obvious abnormality.  Eyes:  Conjunctiva clear, No icterus.   Neck: No enlarged cervical, supraclavicular lymphadenopathy.  Lungs:   Clear to ascultation; symmetrical breath sounds.   Heart:  Regular rate and rhythm. S1 and S2 normal.   Abdomen:  Soft, non-tender, bowel sounds normal.    Extremities:   L forearm with area of erythema and tenderness  Lymph Nodes:  Cervical, supraclavicular or axillary nodes normal.  Musculoskeletal: Symmetrical strength; no focal weakness  Skin:  No ecchymosis.  No rash.   Neurologic:  Alert and oriented x 3.  Normal affect.        Labs     WBC (K/mcL)   Date Value   11/10/2022 1.9 (L)     RBC (mil/mcL)   Date Value   11/10/2022 2.33 (L)     HCT (%)   Date Value   11/10/2022 22.1 (L)     HGB (g/dL)   Date Value   11/10/2022 7.3 (L)     PLT (K/mcL)   Date Value   11/10/2022 173     GOT/AST (Units/L)   Date Value   11/10/2022 25     GPT/ALT (Units/L)   Date Value   11/10/2022 29     No results found for:  GGTP  Alkaline Phosphatase (Units/L)   Date Value   11/10/2022 85     Bilirubin, Total (mg/dL)   Date Value   11/10/2022 1.1 (H)     Sodium (mmol/L)   Date Value   11/10/2022 137     Potassium (mmol/L)   Date Value   11/10/2022 3.9     Chloride (mmol/L)   Date Value   11/10/2022 107     Carbon Dioxide (mmol/L)   Date Value   11/10/2022 27     BUN (mg/dL)   Date Value   11/10/2022 18     Creatinine (mg/dL)   Date Value   11/10/2022 0.79       Lab Services on 10/27/2022   Component Date Value Ref Range Status   • Sodium 10/27/2022 135  135 - 145 mmol/L Final   • Potassium 10/27/2022 4.3  3.4 - 5.1 mmol/L Final   • Chloride 10/27/2022 107  97 - 110 mmol/L Final   • Carbon Dioxide 10/27/2022 24  21 - 32 mmol/L Final   • Anion Gap 10/27/2022 8  7 - 19 mmol/L Final   • Glucose 10/27/2022 92  70 - 99 mg/dL Final   • BUN 10/27/2022 26 (A) 6 - 20 mg/dL Final   • Creatinine 10/27/2022 0.79  0.51 - 0.95 mg/dL Final   • Glomerular Filtration Rate 10/27/2022 74  >=60 Final    eGFR results = or >60 mL/min/1.73m2 = Normal kidney function. Estimated GFR calculated using the CKD-EPI-R (2021) equation that does not include race in the creatinine calculation.   • BUN/ Creatinine Ratio 10/27/2022 33 (A) 7 - 25 Final   • Calcium 10/27/2022 8.9  8.4 - 10.2 mg/dL Final   • Bilirubin, Total 10/27/2022 0.7  0.2 - 1.0 mg/dL Final   • GOT/AST 10/27/2022 23  <=37 Units/L Final   • GPT/ALT 10/27/2022 26  <64 Units/L Final   • Alkaline Phosphatase 10/27/2022 96  45 - 117 Units/L Final   • Albumin 10/27/2022 3.8  3.6 - 5.1 g/dL Final   • Protein, Total 10/27/2022 7.5  6.4 - 8.2 g/dL Final   • Globulin 10/27/2022 3.7  2.0 - 4.0 g/dL Final   • A/G Ratio 10/27/2022 1.0  1.0 - 2.4 Final   • Uric Acid 10/27/2022 3.7  2.6 - 5.9 mg/dL Final   • LD, Total 10/27/2022 301 (A) 82 - 240 Units/L Final   • Magnesium 10/27/2022 2.6 (A) 1.7 - 2.4 mg/dL Final   • Phosphorus 10/27/2022 3.2  2.4 - 4.7 mg/dL Final   • ABO/RH(D) 10/27/2022 O Rh Positive   Final   •  ANTIBODY SCREEN 10/27/2022 Negative   Final   • TYPE AND SCREEN EXPIRATION DATE 10/27/2022 10/30/2022 23:59   Final   • WBC 10/27/2022 3.1 (A) 4.2 - 11.0 K/mcL Final   • RBC 10/27/2022 2.31 (A) 4.00 - 5.20 mil/mcL Final   • HGB 10/27/2022 7.3 (A) 12.0 - 15.5 g/dL Final   • HCT 10/27/2022 23.0 (A) 36.0 - 46.5 % Final   • MCV 10/27/2022 99.6  78.0 - 100.0 fl Final   • MCH 10/27/2022 31.6  26.0 - 34.0 pg Final   • MCHC 10/27/2022 31.7 (A) 32.0 - 36.5 g/dL Final   • RDW-CV 10/27/2022 20.0 (A) 11.0 - 15.0 % Final   • RDW-SD 10/27/2022 70.7 (A) 39.0 - 50.0 fL Final   • PLT 10/27/2022 187  140 - 450 K/mcL Final   • NRBC 10/27/2022 0  <=0 /100 WBC Final   • Neutrophil, Percent 10/27/2022 10  % Final   • Lymphocytes, Percent 10/27/2022 61  % Final   • Mono, Percent 10/27/2022 25  % Final   • Eosinophils, Percent 10/27/2022 0  % Final   • Basophils, Percent 10/27/2022 0  % Final   • Reactive Lymphocytes, Percent 10/27/2022 4  0 - 5 % Final   • Absolute Neutrophil 10/27/2022 0.3 (A) 1.8 - 7.7 K/mcL Final    This result has been called to GUEVARA VEGA RN by Feng Nicholas on 10 27 2022 at 1228, and has been read back.    • Absolute Lymphocytes 10/27/2022 2.0  1.0 - 4.0 K/mcL Final   • Absolute Monocytes 10/27/2022 0.8  0.3 - 0.9 K/mcL Final   • Absolute Eosinophils 10/27/2022 0.0  0.0 - 0.5 K/mcL Final   • Absolute Basophils 10/27/2022 0.0  0.0 - 0.3 K/mcL Final   • WBC Morphology 10/27/2022 Normal  Normal Final   • Dustin Cells 10/27/2022 Few   Final   • Ovalocytes 10/27/2022 Few   Final   • Large Platelets 10/27/2022 Present   Final   • Polychromasia 10/27/2022 Few   Final           ASSESSMENT/PLAN:  81 y/o female admitted for:    Acute myeloid leukemia, antecedent MDS  Cytogenetics, NGS pending  Vidaza 75mg/m2 sq daily x5, Venetoclax 100 -> 200 -> 400mg x21 days initiated 10/31/2022 - day 6 today  Continue Acyclovir and Levaquin prophylaxis, antifungal therapy to be added with cycle 2  Allopurinol 300mg daily  TLS and  DIC panel daily  Transfuse to maintain hgb >7, PLT >10    COVID-19 positive  Previously vaccinated and also received Evusheld  +Nasal congestion, head cold; afebrile  ID consulted - Bebtelovimab, Remdesivir    Thrombophlebitis, ?cellulitis  Remove peripheral IV  Warm compresses  Keflex x 7 day course  US LUE if no improvement    Elevated bilirubin  Suspect treatment related; AST/ALT Alk phos normal  Monitor - stable    Follicular lymphoma, grade 2, stage 3  S/p Rituximab completed 6/2022  No evidence of disease at this time    Anxiety  Continue home meds    Constipation  Stool softener daily  Miralax BID PRN    Lovenox 40mg sq daily if PLT >50  Home this pm

## 2024-07-22 ENCOUNTER — OFFICE VISIT (OUTPATIENT)
Dept: ORTHOPEDIC SURGERY | Facility: CLINIC | Age: 59
End: 2024-07-22
Payer: MEDICAID

## 2024-07-22 VITALS
HEIGHT: 64 IN | WEIGHT: 230 LBS | SYSTOLIC BLOOD PRESSURE: 128 MMHG | DIASTOLIC BLOOD PRESSURE: 70 MMHG | BODY MASS INDEX: 39.27 KG/M2

## 2024-07-22 DIAGNOSIS — M17.12 PRIMARY OSTEOARTHRITIS OF LEFT KNEE: Primary | ICD-10-CM

## 2024-07-22 DIAGNOSIS — E66.09 CLASS 2 OBESITY DUE TO EXCESS CALORIES WITHOUT SERIOUS COMORBIDITY WITH BODY MASS INDEX (BMI) OF 39.0 TO 39.9 IN ADULT: ICD-10-CM

## 2024-07-22 PROCEDURE — 1159F MED LIST DOCD IN RCRD: CPT | Performed by: PHYSICIAN ASSISTANT

## 2024-07-22 PROCEDURE — 99213 OFFICE O/P EST LOW 20 MIN: CPT | Performed by: PHYSICIAN ASSISTANT

## 2024-07-22 PROCEDURE — 1160F RVW MEDS BY RX/DR IN RCRD: CPT | Performed by: PHYSICIAN ASSISTANT

## 2024-07-22 NOTE — PROGRESS NOTES
"    [facility] Note        Subjective     CC: Follow-up (3 week f/u -- Primary osteoarthritis of left knee )      HPI    Liya Sales is a 58 y.o. female.  Patient returns for follow-up of her left knee.  The plan was to start viscosupplementation series today however patient is [place] another [place] and would be unable to complete the series.  She also states that she is only having some minimal discomfort/pain to the knee today.    Pain scale: 0-1/10.  Associated symptoms discomfort and occasional swelling.  Activity related to pain walking.  No reported numbness or tingling into the extremity.    Overall, patient's symptoms are stable.    ROS:    Constiutional:Pt denies fever, chills, nausea, or vomiting.  MSK:as above        Objective      Past Medical History  Past Medical History:   Diagnosis Date    Allergies     Anxiety     Arthritis     left ankle    Carpal tunnel syndrome on right     Depression     Fracture, fibula 1/19/2024    Knee swelling 10/22    Overactive bladder     Tear of meniscus of knee 10/22     Social History     Socioeconomic History    Marital status:     Number of children: 3   Tobacco Use    Smoking status: Never     Passive exposure: Never    Smokeless tobacco: Never   Vaping Use    Vaping status: Never Used   Substance and Sexual Activity    Alcohol use: Yes     Alcohol/week: 2.0 standard drinks of alcohol     Types: 2 Glasses of wine per week     Comment: hard apple cider and wine    Drug use: No    Sexual activity: Yes     Partners: Male     Birth control/protection: Post-menopausal          Physical Exam  /70   Ht 162.6 cm (64.02\")   Wt 104 kg (230 lb)   BMI 39.46 kg/m²     Body mass index is 39.46 kg/m².    Patient is well nourished and well developed.        Ortho Exam  No new exam today.      Imaging/Labs/EMG Reviewed and Interpreted:  No new imaging today.      Assessment:  1. Primary osteoarthritis of left knee    2. Class " 2 obesity due to excess calories without serious comorbidity with body mass index (BMI) of 39.0 to 39.9 in adult        Plan:  Left knee osteoarthritis.  She would only be able to receive 1 viscosupplementation injection before moving to [place] and therefore has declined starting today.  If something happens and she is going to be remaining here for another month or so we can definitely get her in and complete the series.  She declines a corticosteroid injection stating pain is not bad enough to require an injection.  Recommend OTC NSAIDS/pain medication as needed.  Obesity--patient has a BMI of 39.5.  New working on dieting, portion control and calorie restriction for weight loss.  Additionally patient can continue with non impactful exercise.   Good luck with your new job in [place].  Follow up as needed.  Questions and concerns answered.      Nancy Moore PA-C  07/22/24  20:34 EDT      Dictated Utilizing Dragon Dictation.

## 2024-07-22 NOTE — PROGRESS NOTES
Procedure   - Large Joint Arthrocentesis: L knee on 7/22/2024 10:28 AM  Indications: pain  Details: 21 G needle, anterolateral approach  Medications: 20 mg Sodium Hyaluronate (Viscosup) 20 MG/2ML  Outcome: tolerated well, no immediate complications  Procedure, treatment alternatives, risks and benefits explained, specific risks discussed. Consent was given by the patient. Immediately prior to procedure a time out was called to verify the correct patient, procedure, equipment, support staff and site/side marked as required. Patient was prepped and draped in the usual sterile fashion.

## 2024-12-19 ENCOUNTER — OFFICE VISIT (OUTPATIENT)
Dept: BEHAVIORAL HEALTH | Facility: CLINIC | Age: 59
End: 2024-12-19
Payer: COMMERCIAL

## 2024-12-19 VITALS
HEART RATE: 88 BPM | DIASTOLIC BLOOD PRESSURE: 80 MMHG | HEIGHT: 64 IN | BODY MASS INDEX: 41.15 KG/M2 | OXYGEN SATURATION: 98 % | SYSTOLIC BLOOD PRESSURE: 124 MMHG | WEIGHT: 241 LBS

## 2024-12-19 DIAGNOSIS — F33.0 MILD EPISODE OF RECURRENT MAJOR DEPRESSIVE DISORDER: ICD-10-CM

## 2024-12-19 DIAGNOSIS — F90.0 ATTENTION DEFICIT HYPERACTIVITY DISORDER (ADHD), PREDOMINANTLY INATTENTIVE TYPE: ICD-10-CM

## 2024-12-19 DIAGNOSIS — G47.20 CIRCADIAN RHYTHM SLEEP DISORDER, UNSPECIFIED TYPE: ICD-10-CM

## 2024-12-19 DIAGNOSIS — F41.1 GENERALIZED ANXIETY DISORDER: Primary | ICD-10-CM

## 2024-12-19 RX ORDER — SERTRALINE HYDROCHLORIDE 100 MG/1
150 TABLET, FILM COATED ORAL DAILY
Qty: 135 TABLET | Refills: 3 | Status: SHIPPED | OUTPATIENT
Start: 2024-12-19

## 2024-12-19 RX ORDER — DEXMETHYLPHENIDATE HYDROCHLORIDE 20 MG/1
20 CAPSULE, EXTENDED RELEASE ORAL DAILY
Qty: 30 CAPSULE | Refills: 0 | Status: SHIPPED | OUTPATIENT
Start: 2024-12-19

## 2024-12-19 RX ORDER — HYDROXYZINE HYDROCHLORIDE 50 MG/1
TABLET, FILM COATED ORAL
Qty: 30 TABLET | Refills: 2 | Status: SHIPPED | OUTPATIENT
Start: 2024-12-19

## 2024-12-19 NOTE — PROGRESS NOTES
Follow Up Office Visit      Patient Name: Liya Sales  : 1965   MRN: [id number]     Referring Provider: Vaishnavi Verma APRN    Chief Complaint:      ICD-10-CM ICD-9-CM   1. Generalized anxiety disorder  F41.1 300.02   2. Mild episode of recurrent major depressive disorder  F33.0 296.31   3. Circadian rhythm sleep disorder, unspecified type  G47.20 327.30   4. Attention deficit hyperactivity disorder (ADHD), predominantly inattentive type  F90.0 314.00        History of Present Illness:   Liya Sales is a 59 y.o. female who is here today for follow up with psychiatric medications    Subjective      Patient Reports:   Job in [place] is going ok I think.  I am living there and I come back here to visit.  I still take the hydroxyzine a couple times per week and still taking the sertraline every day.  I am looking forward to getting my adhd medicine refilled while I am here so I can use it for work.  I have a lot of reports and things to get done.  Working on getting things in order there with everyone.    Review of Systems:   Review of Systems   Psychiatric/Behavioral:  Positive for decreased concentration and stress.        Screening Scores:   PHQ-9 : 3  ZENAIDA-7 : 3    RISK ASSESSMENT:  Patient denies any high risk factors today.    Medications:     Current Outpatient Medications:     acetaminophen (TYLENOL) 500 MG tablet, Take 1 tablet by mouth Every 6 (Six) Hours As Needed for Mild Pain., Disp: 30 tablet, Rfl: 0    ammonium lactate (AmLactin) 12 % lotion, Apply  topically to the appropriate area as directed As Needed for Dry Skin., Disp: 225 g, Rfl: 1    atorvastatin (LIPITOR) 40 MG tablet, TAKE 1 TABLET BY MOUTH EVERY DAY AT NIGHT, Disp: 90 tablet, Rfl: 1    dexmethylphenidate XR (Focalin XR) 20 MG 24 hr capsule, Take 1 capsule by mouth Daily, Disp: 30 capsule, Rfl: 0    famotidine (PEPCID) 40 MG tablet, TAKE 1 TABLET BY MOUTH EVERY DAY, Disp: 90 tablet, Rfl: 0    hydrOXYzine  "(ATARAX) 50 MG tablet, Take 1 tablet by mouth every night at bedtime as needed for sleep/anxiety., Disp: 30 tablet, Rfl: 2    levocetirizine (Xyzal Allergy 24HR) 5 MG tablet, Take 1 tablet by mouth Every Evening., Disp: , Rfl:     Mirabegron ER (MYRBETRIQ) 50 MG tablet sustained-release 24 hour 24 hr tablet, Take 50 mg by mouth Daily., Disp: , Rfl:     multivitamin with minerals tablet tablet, Take 1 tablet by mouth Daily., Disp: , Rfl:     omeprazole (priLOSEC) 40 MG capsule, TAKE 1 CAPSULE BY MOUTH EVERY DAY, Disp: 90 capsule, Rfl: 1    sertraline (ZOLOFT) 100 MG tablet, Take 1.5 tablets by mouth Daily., Disp: 135 tablet, Rfl: 3    triamcinolone (KENALOG) 0.1 % ointment, Apply 1 application topically to the appropriate area as directed 2 (Two) Times a Day., Disp: 80 g, Rfl: 1    Turmeric (QC TUMERIC COMPLEX PO), Take 1 capsule by mouth Daily., Disp: , Rfl:     Medication Considerations:  GISELLE reviewed and appropriate.      Allergies:   Allergies   Allergen Reactions    Erythromycin GI Intolerance       Results Reviewed:   screeners     Objective     Physical Exam:  Vital Signs:   Vitals:    12/19/24 1433   BP: 124/80   Pulse: 88   SpO2: 98%   Weight: 109 kg (241 lb)   Height: 162.6 cm (64\")     Body mass index is 41.37 kg/m².     Mental Status Exam:   Hygiene:   good  Cooperation:  Cooperative  Eye Contact:  Good  Psychomotor Behavior:  Appropriate  Affect:  Appropriate  Mood: normal  Speech:  Normal  Thought Process:  Goal directed and Linear  Thought Content:  Normal  Suicidal:  None  Homicidal:  None  Hallucinations:  None  Delusion:  None  Memory:  Intact  Orientation:  Grossly intact  Reliability:  good  Insight:  Good  Judgement:  Good  Impulse Control:  Good  Physical/Medical Issues:   nothing reported today      Assessment / Plan      Visit Diagnosis/Orders Placed This Visit:  Diagnoses and all orders for this visit:    1. Generalized anxiety disorder (Primary)  -     sertraline (ZOLOFT) 100 MG tablet; " Take 1.5 tablets by mouth Daily.  Dispense: 135 tablet; Refill: 3  -     hydrOXYzine (ATARAX) 50 MG tablet; Take 1 tablet by mouth every night at bedtime as needed for sleep/anxiety.  Dispense: 30 tablet; Refill: 2    2. Mild episode of recurrent major depressive disorder  -     sertraline (ZOLOFT) 100 MG tablet; Take 1.5 tablets by mouth Daily.  Dispense: 135 tablet; Refill: 3    3. Circadian rhythm sleep disorder, unspecified type  -     hydrOXYzine (ATARAX) 50 MG tablet; Take 1 tablet by mouth every night at bedtime as needed for sleep/anxiety.  Dispense: 30 tablet; Refill: 2    4. Attention deficit hyperactivity disorder (ADHD), predominantly inattentive type  -     dexmethylphenidate XR (Focalin XR) 20 MG 24 hr capsule; Take 1 capsule by mouth Daily  Dispense: 30 capsule; Refill: 0         Functional Status: Mild impairment     Prognosis: Good with Ongoing Treatment     Impression/Formulation:  Patient appeared alert and oriented. Patient is receptive to assistance with maintaining a stable lifestyle.  Patient presents with history of     ICD-10-CM ICD-9-CM   1. Generalized anxiety disorder  F41.1 300.02   2. Mild episode of recurrent major depressive disorder  F33.0 296.31   3. Circadian rhythm sleep disorder, unspecified type  G47.20 327.30   4. Attention deficit hyperactivity disorder (ADHD), predominantly inattentive type  F90.0 314.00   Patient screened positive for depression based on a PHQ-9 score of 3 on 12/19/2024. Follow-up recommendations include: Prescribed antidepressant medication treatment.  Sertraline continues to be effective with managing depression and anxiety.  Patient is experiencing increased stress at work and at home but is working towards positive outcomes.  Patient continues to take hydroxyzine as needed at bedtime, she utilizes it 1-2 times per week usually.    Treatment Plan:   Continue hydroxzyine prn for sleep/anxiety  Continue focalin xr for adhd  Continue sertraline for  depression/anxiety    Patient will continue supportive psychotherapy efforts and medications as indicated. Clinic will obtain release of information for current treatment team for continuity of care as needed. Patient will contact this office, call 911 or present to the nearest emergency room should suicidal or homicidal ideations occur.  Discussed medication options and treatment plan of prescribed medication(s) as well as the risks, benefits, and potential side effects. Patient ackowledged and verbally consented to continue with current treatment plan and was educated on the importance of compliance with treatment and follow-up appointments.     Follow Up:   Return in about 3 months (around 3/19/2025) for Med Check.        LAURA Fox, WILL-BC  [facility]     This is electronically signed by LAURA Fox, ABDOULAYE  [unfilled] 15:47 EST

## (undated) DEVICE — TBG PENCL TELESCP MEGADYNE SMOKE EVAC 10FT

## (undated) DEVICE — BNDG ELAS CO-FLEX SLF ADHR 6IN 5YD LF STRL

## (undated) DEVICE — TAPE,CLOTH/SILK,CURAD,3"X10YD,LF,40/CS: Brand: CURAD

## (undated) DEVICE — Device

## (undated) DEVICE — SUT ETHLN 3/0 PC5 18IN 1893G

## (undated) DEVICE — INTENDED FOR TISSUE SEPARATION, AND OTHER PROCEDURES THAT REQUIRE A SHARP SURGICAL BLADE TO PUNCTURE OR CUT.: Brand: BARD-PARKER ® SAFETYLOCK CARBON RIB-BACK BLADES

## (undated) DEVICE — PIN FIX TEMP BBTAK

## (undated) DEVICE — DRP C/ARMOR

## (undated) DEVICE — TRAP FLD MINIVAC MEGADYNE 100ML

## (undated) DEVICE — SPNG GZ WOVN 4X4IN 12PLY 10/BX STRL

## (undated) DEVICE — UNDERGLV SURG BIOGEL INDICATOR LF PF 7.5

## (undated) DEVICE — DRESSING,GAUZE,XEROFORM,CURAD,1"X8",ST: Brand: CURAD

## (undated) DEVICE — PAD,ARMBOARD,CONV,FOAM,2X8X20",12PR/CS: Brand: MEDLINE

## (undated) DEVICE — DRSNG PAD ABD 8X10IN STRL

## (undated) DEVICE — APPL CHLORAPREP TINTED 26ML TEAL

## (undated) DEVICE — BIT DRL TI 2.5MM

## (undated) DEVICE — 450 ML BOTTLE OF 0.05% CHLORHEXIDINE GLUCONATE IN 99.95% STERILE WATER FOR IRRIGATION, USP AND APPLICATOR.: Brand: IRRISEPT ANTIMICROBIAL WOUND LAVAGE

## (undated) DEVICE — ANTIBACTERIAL UNDYED BRAIDED (POLYGLACTIN 910), SYNTHETIC ABSORBABLE SUTURE: Brand: COATED VICRYL

## (undated) DEVICE — SUT MNCRYL PLS ANTIB UD 3/0 PS2 27IN

## (undated) DEVICE — UNDERCAST PADDING: Brand: DEROYAL

## (undated) DEVICE — SUT MNCRYL 2/0 SH 27IN UD MCP417H

## (undated) DEVICE — STRAP POSTN KN/BDY FM 5X72IN DISP

## (undated) DEVICE — BIT DRL 2.5X110MM

## (undated) DEVICE — BNDG ELAS CO-FLEX SLF ADHR 4IN5YD LF STRL

## (undated) DEVICE — BLANKT WARM UPPR/BDY ARM/OUT 57X196CM

## (undated) DEVICE — KT PUMP INFUBLOCK MDL 2100 PMKITSOLIS

## (undated) DEVICE — DRAPE,TOP,102X53,STERILE: Brand: MEDLINE

## (undated) DEVICE — PATIENT RETURN ELECTRODE, SINGLE-USE, CONTACT QUALITY MONITORING, ADULT, WITH 9FT CORD, FOR PATIENTS WEIGING OVER 33LBS. (15KG): Brand: MEGADYNE

## (undated) DEVICE — SUT VIC 0 SH 27IN J418H

## (undated) DEVICE — DISPOSABLE TOURNIQUET CUFF SINGLE BLADDER, DUAL PORT AND QUICK CONNECT CONNECTOR: Brand: COLOR CUFF

## (undated) DEVICE — BIT DRL 2.7MM

## (undated) DEVICE — SNAP KOVER: Brand: UNBRANDED

## (undated) DEVICE — PK EXTREM LOWR 10

## (undated) DEVICE — GLV SURG SENSICARE PI ORTHO SZ7.5 LF STRL